# Patient Record
Sex: MALE | Race: WHITE | NOT HISPANIC OR LATINO
[De-identification: names, ages, dates, MRNs, and addresses within clinical notes are randomized per-mention and may not be internally consistent; named-entity substitution may affect disease eponyms.]

---

## 2018-11-18 PROBLEM — Z00.00 ENCOUNTER FOR PREVENTIVE HEALTH EXAMINATION: Status: ACTIVE | Noted: 2018-11-18

## 2018-12-18 ENCOUNTER — APPOINTMENT (OUTPATIENT)
Dept: UROLOGY | Facility: CLINIC | Age: 48
End: 2018-12-18
Payer: COMMERCIAL

## 2018-12-18 VITALS
SYSTOLIC BLOOD PRESSURE: 128 MMHG | HEART RATE: 83 BPM | WEIGHT: 220 LBS | DIASTOLIC BLOOD PRESSURE: 86 MMHG | HEIGHT: 69 IN | BODY MASS INDEX: 32.58 KG/M2

## 2018-12-18 DIAGNOSIS — N28.1 CYST OF KIDNEY, ACQUIRED: ICD-10-CM

## 2018-12-18 DIAGNOSIS — Z78.9 OTHER SPECIFIED HEALTH STATUS: ICD-10-CM

## 2018-12-18 DIAGNOSIS — Z82.49 FAMILY HISTORY OF ISCHEMIC HEART DISEASE AND OTHER DISEASES OF THE CIRCULATORY SYSTEM: ICD-10-CM

## 2018-12-18 LAB
BILIRUB UR QL STRIP: NORMAL
CLARITY UR: CLEAR
COLLECTION METHOD: NORMAL
GLUCOSE UR-MCNC: NORMAL
HCG UR QL: NORMAL EU/DL
HGB UR QL STRIP.AUTO: 10
KETONES UR-MCNC: NORMAL
LEUKOCYTE ESTERASE UR QL STRIP: NORMAL
NITRITE UR QL STRIP: NORMAL
PH UR STRIP: 7
PROT UR STRIP-MCNC: NORMAL
SP GR UR STRIP: 1

## 2018-12-18 PROCEDURE — 81003 URINALYSIS AUTO W/O SCOPE: CPT | Mod: QW

## 2018-12-18 PROCEDURE — 99213 OFFICE O/P EST LOW 20 MIN: CPT

## 2018-12-18 RX ORDER — BUDESONIDE AND FORMOTEROL FUMARATE DIHYDRATE 80; 4.5 UG/1; UG/1
80-4.5 AEROSOL RESPIRATORY (INHALATION)
Refills: 0 | Status: ACTIVE | COMMUNITY

## 2018-12-18 RX ORDER — TIOTROPIUM BROMIDE INHALATION SPRAY 1.56 UG/1
1.25 SPRAY, METERED RESPIRATORY (INHALATION)
Refills: 0 | Status: ACTIVE | COMMUNITY

## 2018-12-18 RX ORDER — RANITIDINE HCL 150 MG
CAPSULE ORAL
Refills: 0 | Status: ACTIVE | COMMUNITY

## 2018-12-18 RX ORDER — TAMSULOSIN HYDROCHLORIDE 0.4 MG/1
0.4 CAPSULE ORAL
Qty: 90 | Refills: 3 | Status: ACTIVE | COMMUNITY
Start: 2018-12-18 | End: 1900-01-01

## 2018-12-18 RX ORDER — ALBUTEROL SULFATE 90 UG/1
108 (90 BASE) AEROSOL, METERED RESPIRATORY (INHALATION)
Refills: 0 | Status: ACTIVE | COMMUNITY

## 2019-03-19 ENCOUNTER — APPOINTMENT (OUTPATIENT)
Dept: UROLOGY | Facility: CLINIC | Age: 49
End: 2019-03-19
Payer: COMMERCIAL

## 2019-03-19 ENCOUNTER — OTHER (OUTPATIENT)
Age: 49
End: 2019-03-19

## 2019-03-19 VITALS — BODY MASS INDEX: 32.58 KG/M2 | HEIGHT: 69 IN | WEIGHT: 220 LBS

## 2019-03-19 DIAGNOSIS — N23 UNSPECIFIED RENAL COLIC: ICD-10-CM

## 2019-03-19 LAB
BILIRUB UR QL STRIP: NORMAL
CLARITY UR: CLEAR
COLLECTION METHOD: NORMAL
GLUCOSE UR-MCNC: NORMAL
HCG UR QL: NORMAL EU/DL
HGB UR QL STRIP.AUTO: NORMAL
KETONES UR-MCNC: NORMAL
LEUKOCYTE ESTERASE UR QL STRIP: 75
NITRITE UR QL STRIP: NORMAL
PH UR STRIP: 5
PROT UR STRIP-MCNC: NORMAL
SP GR UR STRIP: 1.01

## 2019-03-19 PROCEDURE — 99213 OFFICE O/P EST LOW 20 MIN: CPT

## 2019-03-19 PROCEDURE — 81003 URINALYSIS AUTO W/O SCOPE: CPT | Mod: QW

## 2019-03-19 NOTE — PHYSICAL EXAM
[General Appearance - In No Acute Distress] : no acute distress [Abdomen Soft] : soft [Abdomen Tenderness] : non-tender [Heart Rate And Rhythm] : Heart rate and rhythm were normal [Costovertebral Angle Tenderness] : no ~M costovertebral angle tenderness [] : no respiratory distress [Normal Station and Gait] : the gait and station were normal for the patient's age

## 2019-03-19 NOTE — ASSESSMENT
[FreeTextEntry1] : Patient was right lower abdominal pain and benign exam. We'll get CT scan to assess

## 2019-03-19 NOTE — HISTORY OF PRESENT ILLNESS
[FreeTextEntry1] : 48-year-old complaining of right abdominal pain which was intermittent and now becoming constant. He also has back pain. PMD ordered abdominal ultrasound showed fatty liver, and renal cysts.\par \par There is no bowel complaint\par \par Regarding his lower urinary tract symptoms he states they are improved on tamsulosin\par \par

## 2019-03-19 NOTE — REVIEW OF SYSTEMS
[Fever] : no fever [Shortness Of Breath] : no shortness of breath [Chest Pain] : no chest pain [Constipation] : no constipation [Diarrhea] : no diarrhea [Dysuria] : no dysuria [Confused] : no confusion

## 2019-04-12 ENCOUNTER — APPOINTMENT (OUTPATIENT)
Dept: UROLOGY | Facility: CLINIC | Age: 49
End: 2019-04-12
Payer: COMMERCIAL

## 2019-04-12 ENCOUNTER — OUTPATIENT (OUTPATIENT)
Dept: OUTPATIENT SERVICES | Facility: HOSPITAL | Age: 49
LOS: 1 days | Discharge: HOME | End: 2019-04-12

## 2019-04-12 DIAGNOSIS — N39.0 URINARY TRACT INFECTION, SITE NOT SPECIFIED: ICD-10-CM

## 2019-04-12 PROCEDURE — 99213 OFFICE O/P EST LOW 20 MIN: CPT

## 2019-04-12 NOTE — REVIEW OF SYSTEMS
[Fever] : no fever [Chest Pain] : no chest pain [Abdominal Pain] : no abdominal pain [Shortness Of Breath] : no shortness of breath [Confused] : no confusion

## 2019-04-12 NOTE — PHYSICAL EXAM
[General Appearance - In No Acute Distress] : no acute distress [Abdomen Soft] : soft [Abdomen Tenderness] : non-tender [] : no respiratory distress [Costovertebral Angle Tenderness] : no ~M costovertebral angle tenderness [Oriented To Time, Place, And Person] : oriented to person, place, and time [Normal Station and Gait] : the gait and station were normal for the patient's age

## 2019-04-12 NOTE — ASSESSMENT
[FreeTextEntry1] : Nothing on CAT scan to suggest reason for pain. Because of dysuria and ejaculatory pain Will empirically start on Cipro and send off culture and we see in 3 weeks to see if there is resolution. In the meantime a recommendation followup with his primary and GI to look for non-urologic causes of the pain [Urinary Tract Infection (599.0\N39.0)] : qualitative ~C was positive

## 2019-04-12 NOTE — HISTORY OF PRESENT ILLNESS
[FreeTextEntry1] : 48-year-old complaining of right abdominal pain which is bothering the patient. Renal sonogram showed no significant findings that would be responsible for his pain. CT scan of the abdomen showed no changes compared to previous CT scan and no reason for his abdominal pain. He continues on tamsulosin 0.4 daily for BPH and now also complains of some dysuria And ejaculatory discomfort

## 2019-04-16 LAB — BACTERIA UR CULT: ABNORMAL

## 2019-05-10 ENCOUNTER — APPOINTMENT (OUTPATIENT)
Dept: UROLOGY | Facility: CLINIC | Age: 49
End: 2019-05-10

## 2019-10-15 ENCOUNTER — APPOINTMENT (OUTPATIENT)
Dept: GASTROENTEROLOGY | Facility: CLINIC | Age: 49
End: 2019-10-15

## 2020-04-23 ENCOUNTER — APPOINTMENT (OUTPATIENT)
Dept: UROLOGY | Facility: CLINIC | Age: 50
End: 2020-04-23
Payer: COMMERCIAL

## 2020-04-23 ENCOUNTER — TRANSCRIPTION ENCOUNTER (OUTPATIENT)
Age: 50
End: 2020-04-23

## 2020-04-23 PROCEDURE — 99213 OFFICE O/P EST LOW 20 MIN: CPT | Mod: 95

## 2020-04-23 NOTE — REVIEW OF SYSTEMS
[Fever] : no fever [Chest Pain] : no chest pain [Cough] : no cough [Shortness Of Breath] : shortness of breath [Diarrhea] : no diarrhea [Constipation] : no constipation [Dysuria] : no dysuria [Confused] : no confusion

## 2020-04-23 NOTE — HISTORY OF PRESENT ILLNESS
[Self] : self [Home] : at home, [unfilled] , at the time of the visit. [Other Location: e.g. Home (Enter Location, City,State)___] : at [unfilled] [Verbal consent obtained from patient] : the patient, [unfilled] [Patient] : the patient [FreeTextEntry2] : 4/23/20 at 1100 [Hematuria - Gross] : no gross hematuria [Dysuria] : no dysuria [FreeTextEntry1] : 49 year old with persistant urinary frequency, urgency, nocturia X 5, hesitancy but stream ok once it starts.  One year ago he received cipro for enterococcus uti and dysuria and ejaculatory pain with improvement of that but persistant frequency, urgency.  He is no better on tamsulosin, and oxybutynin er and vesicare were no help.  1 year ago, ct and sono were negative.  Trus is the past showed a 20 cc prostate, but cysto showed occlussive prostate 3.5 cm. Psa in 2016 was 1.1.  He has a hisotry of benign microhematuria work up and renal cysts. there is no fam history of prostate cancer.\par \par He has chronic right groin pain\par \par He continues to be followed at [facility] for stable pulm nodules and right adrenal lesion.  He has a long history of copd on multiple meds and chronic sob without change

## 2020-04-23 NOTE — ASSESSMENT
[FreeTextEntry1] : Worsening  LUTS which pt states is very bothersome.  Will order bladder sono, PSA, Urine culture.  Will Change to rapaflo after discussion with pt.  Rec follow up in office to examine for ing hernia [Urinary Tract Infection (599.0\N39.0)] : qualitative ~C was positive

## 2020-04-23 NOTE — PHYSICAL EXAM
[General Appearance - In No Acute Distress] : no acute distress [Costovertebral Angle Tenderness] : no ~M costovertebral angle tenderness [Edema] : no peripheral edema [FreeTextEntry1] : mild discomort with palpation of sp area [Oriented To Time, Place, And Person] : oriented to person, place, and time [] : no respiratory distress

## 2020-05-13 ENCOUNTER — APPOINTMENT (OUTPATIENT)
Dept: UROLOGY | Facility: CLINIC | Age: 50
End: 2020-05-13

## 2020-06-05 NOTE — HISTORY OF PRESENT ILLNESS
[Home] : at home, [unfilled] , at the time of the visit. [Medical Office: ([facility])___] : at the medical office located in  [Verbal consent obtained from patient] : the patient, [unfilled] [FreeTextEntry1] : phone number [phone]\par email of [email or web address]

## 2020-06-08 ENCOUNTER — APPOINTMENT (OUTPATIENT)
Dept: UROLOGY | Facility: CLINIC | Age: 50
End: 2020-06-08

## 2020-06-09 ENCOUNTER — TRANSCRIPTION ENCOUNTER (OUTPATIENT)
Age: 50
End: 2020-06-09

## 2020-06-16 ENCOUNTER — APPOINTMENT (OUTPATIENT)
Dept: UROLOGY | Facility: CLINIC | Age: 50
End: 2020-06-16
Payer: COMMERCIAL

## 2020-06-16 PROCEDURE — 99213 OFFICE O/P EST LOW 20 MIN: CPT | Mod: 95

## 2020-06-16 NOTE — ASSESSMENT
[FreeTextEntry1] : Pt with continued fx, urgency, nocturia despite 2 alpha blockers, and 2 bladder relaxants.  Rec urodynamic eval and discuused pod=ssible nerve stim and botox.

## 2020-06-16 NOTE — HISTORY OF PRESENT ILLNESS
[Home] : at home, [unfilled] , at the time of the visit. [Medical Office: ([facility])___] : at the medical office located in  [Verbal consent obtained from patient] : the patient, [unfilled] [FreeTextEntry1] : phone number [phone]\par email of [email or web address]\par Patient continues to have frequency, urgency, noct x 5 despite rapaflo 8 daily.  Already no response to flomax, oxybutynin er, vesicare.  He does get better flow with the rapaflo however.  U cand s neg, psa .64, bladder son no pvr and nl size prostate.  previous cysto showed occlusive rostate however.\par  [Dysuria] : no dysuria [Hematuria - Gross] : no gross hematuria [Flank Pain] : no flank pain

## 2020-06-16 NOTE — PHYSICAL EXAM
[Normal Appearance] : normal appearance [General Appearance - In No Acute Distress] : no acute distress [] : no respiratory distress

## 2020-06-16 NOTE — REVIEW OF SYSTEMS
[Fever] : no fever [Feeling Tired] : feeling tired [Sore Throat] : no sore throat [Nasal Discharge] : no nasal discharge [Lower Ext Edema] : no extremity edema [Shortness Of Breath] : no shortness of breath [Chest Pain] : no chest pain [Constipation] : no constipation [Diarrhea] : no diarrhea [Cough] : no cough

## 2020-06-30 ENCOUNTER — APPOINTMENT (OUTPATIENT)
Dept: UROLOGY | Facility: CLINIC | Age: 50
End: 2020-06-30
Payer: COMMERCIAL

## 2020-06-30 VITALS — TEMPERATURE: 97.7 F | WEIGHT: 220 LBS | HEIGHT: 69 IN | BODY MASS INDEX: 32.58 KG/M2

## 2020-06-30 PROCEDURE — 99213 OFFICE O/P EST LOW 20 MIN: CPT

## 2020-06-30 NOTE — HISTORY OF PRESENT ILLNESS
[FreeTextEntry1] : 49-year-old with persistent urinary frequency and urgency scheduled for urodynamics in the future. He came in today because of persistent chronic right lower abdominal pain and once we checked for hernia. Isn't history of a right paramedian incision for surgery done in 1982. He continues to have severe frequency urgency and nocturia with reasonable urinary flow, no bowel troubles. [Urinary Incontinence] : no urinary incontinence [Urinary Urgency] : urinary urgency [Urinary Frequency] : urinary frequency [Nocturia] : nocturia [Weak Stream] : no weak stream [Dysuria] : no dysuria [Hematuria - Gross] : no gross hematuria [Flank Pain] : no flank pain

## 2020-06-30 NOTE — REVIEW OF SYSTEMS
[Fever] : no fever [Feeling Poorly] : not feeling poorly [Heart Rate Is Slow] : the heart rate was not slow [Chest Pain] : no chest pain [Shortness Of Breath] : no shortness of breath [Cough] : no cough [Constipation] : no constipation [Diarrhea] : no diarrhea [Dysuria] : no dysuria

## 2020-06-30 NOTE — ASSESSMENT
[FreeTextEntry1] : Regarding persistent right lower quadrant discomfort recommend general surgery consultation.\par \par Regarding frequency urgency patient has appointment for urodynamic evaluation and again risks benefits and alternatives fully discussed

## 2020-07-10 ENCOUNTER — APPOINTMENT (OUTPATIENT)
Dept: UROLOGY | Facility: CLINIC | Age: 50
End: 2020-07-10
Payer: COMMERCIAL

## 2020-07-10 DIAGNOSIS — Z87.440 PERSONAL HISTORY OF URINARY (TRACT) INFECTIONS: ICD-10-CM

## 2020-07-10 DIAGNOSIS — R35.0 FREQUENCY OF MICTURITION: ICD-10-CM

## 2020-07-10 DIAGNOSIS — R31.29 OTHER MICROSCOPIC HEMATURIA: ICD-10-CM

## 2020-07-10 PROCEDURE — 51784 ANAL/URINARY MUSCLE STUDY: CPT

## 2020-07-10 PROCEDURE — 51728 CYSTOMETROGRAM W/VP: CPT

## 2020-07-10 PROCEDURE — 51741 ELECTRO-UROFLOWMETRY FIRST: CPT

## 2020-07-14 LAB — URINE CYTOLOGY: NORMAL

## 2020-08-07 ENCOUNTER — APPOINTMENT (OUTPATIENT)
Dept: UROLOGY | Facility: CLINIC | Age: 50
End: 2020-08-07
Payer: COMMERCIAL

## 2020-08-07 PROCEDURE — 52000 CYSTOURETHROSCOPY: CPT

## 2020-08-20 ENCOUNTER — TRANSCRIPTION ENCOUNTER (OUTPATIENT)
Age: 50
End: 2020-08-20

## 2020-09-16 ENCOUNTER — TRANSCRIPTION ENCOUNTER (OUTPATIENT)
Age: 50
End: 2020-09-16

## 2020-09-28 ENCOUNTER — OUTPATIENT (OUTPATIENT)
Dept: OUTPATIENT SERVICES | Facility: HOSPITAL | Age: 50
LOS: 1 days | Discharge: HOME | End: 2020-09-28
Payer: COMMERCIAL

## 2020-09-28 ENCOUNTER — TRANSCRIPTION ENCOUNTER (OUTPATIENT)
Age: 50
End: 2020-09-28

## 2020-09-28 DIAGNOSIS — R93.49 ABNORMAL RADIOLOGIC FINDINGS ON DIAGNOSTIC IMAGING OF OTHER URINARY ORGANS: ICD-10-CM

## 2020-09-28 PROCEDURE — 74183 MRI ABD W/O CNTR FLWD CNTR: CPT | Mod: 26

## 2020-10-09 ENCOUNTER — APPOINTMENT (OUTPATIENT)
Dept: UROLOGY | Facility: CLINIC | Age: 50
End: 2020-10-09
Payer: COMMERCIAL

## 2020-10-09 PROCEDURE — 99213 OFFICE O/P EST LOW 20 MIN: CPT | Mod: 25

## 2020-10-09 PROCEDURE — 51702 INSERT TEMP BLADDER CATH: CPT

## 2020-10-09 NOTE — HISTORY OF PRESENT ILLNESS
[FreeTextEntry1] : Blake is a 49-year-old male with history of urinary frequency and urgency, status post urodynamic testing which demonstrated sensory urgency with low amplitude contractions and bladder overactivity. Cystoscopy demonstrated a bladder neck obstruction. He improved on rapaflo.\par \par Ultrasound demonstrated questionable lesion on kidney. Patient allergic to iodine contrast and MRI was performed.\par \par MRI demonstrated a 1.1 x 1.0 cm right interpolar renal lesion with questionable low-level enhancement, recommend followup in 3-6 months with MRI

## 2020-10-09 NOTE — ASSESSMENT
[FreeTextEntry1] : 49-year-old male with history of urinary frequency and urgency improved on Rapaflo. MRI demonstrated a 1 cm right renal lesion with questionable low-level enhancement. All options were reviewed in detail the patient elected for observation.\par \par -Continue medication\par -Follow up 3 months MRI

## 2020-11-05 ENCOUNTER — NON-APPOINTMENT (OUTPATIENT)
Age: 50
End: 2020-11-05

## 2020-11-05 ENCOUNTER — TRANSCRIPTION ENCOUNTER (OUTPATIENT)
Age: 50
End: 2020-11-05

## 2020-11-05 ENCOUNTER — APPOINTMENT (OUTPATIENT)
Dept: SURGERY | Facility: CLINIC | Age: 50
End: 2020-11-05
Payer: COMMERCIAL

## 2020-11-05 VITALS — WEIGHT: 248 LBS | BODY MASS INDEX: 36.73 KG/M2 | HEIGHT: 69 IN

## 2020-11-05 DIAGNOSIS — M62.08 SEPARATION OF MUSCLE (NONTRAUMATIC), OTHER SITE: ICD-10-CM

## 2020-11-05 DIAGNOSIS — K42.9 UMBILICAL HERNIA W/OUT OBSTRUCTION OR GANGRENE: ICD-10-CM

## 2020-11-05 DIAGNOSIS — S39.011A STRAIN OF MUSCLE, FASCIA AND TENDON OF ABDOMEN, INITIAL ENCOUNTER: ICD-10-CM

## 2020-11-05 DIAGNOSIS — E66.09 OTHER OBESITY DUE TO EXCESS CALORIES: ICD-10-CM

## 2020-11-05 PROCEDURE — 99072 ADDL SUPL MATRL&STAF TM PHE: CPT

## 2020-11-05 PROCEDURE — 99243 OFF/OP CNSLTJ NEW/EST LOW 30: CPT

## 2020-11-05 NOTE — CONSULT LETTER
[Dear  ___] : Dear  [unfilled], [Courtesy Letter:] : I had the pleasure of seeing your patient, [unfilled], in my office today. [Please see my note below.] : Please see my note below. [Consult Closing:] : Thank you very much for allowing me to participate in the care of this patient.  If you have any questions, please do not hesitate to contact me. [FreeTextEntry3] : Respectfully,\par \par Mannie Scanlon M.D., FACS\par  [DrAlireza  ___] : Dr. SHAW

## 2020-11-05 NOTE — ASSESSMENT
[FreeTextEntry1] : Blake is a pleasant 49-year-old retired/disabled gentleman with a past medical history significant for asthma, GERD, sleep apnea, back problems on chronic narcotic medication along with chronic kidney disease and a past surgical history significant for laparoscopic cholecystectomy in 2005 for an open appendectomy in 1982 who presents to the office with concerns about right lower quadrant pain and discomfort. He had a recent CT scan of the abdomen and pelvis did not demonstrate any significant pathology or hernia in the right lower quadrant. I was able to review the images and the report with Blake today.\par \par Physical examination demonstrates a moderate degree of weakness in the right groin but no evidence of a right inguinal hernia. He has some mild weakness in the left groin but no obvious hernia. Both testicles are normal. There is no evidence of a right lower quadrant incisional hernia. He does have a small nontender easily reducible umbilical hernia which is of no clinical concern at this time. He does have a moderate diastasis recti which is likely related to his excess abdominal weight. His current BMI is 37. \par \par Blake was counseled and reassured. He is complaining of generalized abdominal symptoms including constipation and intermittent pain along with urinary frequency. I do not believe any of his symptoms are related to his small periumbilical hernia. He has gained 28 pounds over the recent past and I believe this may be contributing to some of his symptoms along with his right lower quadrant abdominal wall muscle strain. He was encouraged to return to his gastroenterologist for further evaluation and we also discussed the importance of calorie restriction and healthy eating with regard to weight loss, hernia development and recurrence, and his overall health. He was encouraged to return to me in the future if any hernia-related issues arise, of course.

## 2020-11-05 NOTE — PHYSICAL EXAM
[JVD] : no jugular venous distention  [Normal Breath Sounds] : Normal breath sounds [No Rash or Lesion] : No rash or lesion [Alert] : alert [Calm] : calm [de-identified] : overweight [de-identified] : normal [de-identified] : protuberant abdomen, moderate diastasis recti\par  [de-identified] : normal testicles [de-identified] : small reducible umbilical hernia, no groin hernia, no incisional hernia

## 2020-12-21 ENCOUNTER — TRANSCRIPTION ENCOUNTER (OUTPATIENT)
Age: 50
End: 2020-12-21

## 2020-12-23 PROBLEM — Z87.440 HISTORY OF URINARY TRACT INFECTION: Status: RESOLVED | Noted: 2019-04-12 | Resolved: 2020-12-23

## 2020-12-28 ENCOUNTER — OUTPATIENT (OUTPATIENT)
Dept: OUTPATIENT SERVICES | Facility: HOSPITAL | Age: 50
LOS: 1 days | Discharge: HOME | End: 2020-12-28
Payer: COMMERCIAL

## 2020-12-28 DIAGNOSIS — N28.1 CYST OF KIDNEY, ACQUIRED: ICD-10-CM

## 2020-12-28 PROCEDURE — 74183 MRI ABD W/O CNTR FLWD CNTR: CPT | Mod: 26

## 2021-01-01 NOTE — PHYSICAL EXAM
Statement Selected [Normal Appearance] : normal appearance [General Appearance - In No Acute Distress] : no acute distress [Abdomen Soft] : soft [FreeTextEntry1] : Obese abdomen with right paramedian lower quadrant scar. No palpable hernia in this area. [Abdomen Hernia] : no hernia was discovered [Abdomen Tenderness] : non-tender [Epididymis] : the epididymides were normal [Scrotum] : the scrotum was normal [Testes Tenderness] : no tenderness of the testes [Testes Mass (___cm)] : there were no testicular masses [Skin Color & Pigmentation] : normal skin color and pigmentation [Edema] : no peripheral edema [] : no respiratory distress [Oriented To Time, Place, And Person] : oriented to person, place, and time [Not Anxious] : not anxious [Normal Station and Gait] : the gait and station were normal for the patient's age

## 2021-01-06 ENCOUNTER — TRANSCRIPTION ENCOUNTER (OUTPATIENT)
Age: 51
End: 2021-01-06

## 2021-01-15 ENCOUNTER — APPOINTMENT (OUTPATIENT)
Dept: UROLOGY | Facility: CLINIC | Age: 51
End: 2021-01-15

## 2021-01-19 ENCOUNTER — APPOINTMENT (OUTPATIENT)
Dept: UROLOGY | Facility: CLINIC | Age: 51
End: 2021-01-19
Payer: COMMERCIAL

## 2021-01-19 PROCEDURE — 99213 OFFICE O/P EST LOW 20 MIN: CPT | Mod: 95

## 2021-01-19 NOTE — REVIEW OF SYSTEMS
[Fever] : no fever [Nasal Discharge] : no nasal discharge [Cough] : no cough [Confused] : no confusion

## 2021-01-19 NOTE — ASSESSMENT
[FreeTextEntry1] : Stable right renal lesion.  reimabe in 6 m\par improved frequency urgency  cont rapaflo

## 2021-01-19 NOTE — HISTORY OF PRESENT ILLNESS
[FreeTextEntry1] : Audio visual telemed visit via [employer] platform.  Pt in his home. verbal consent given by pt.  Hx of right renal lesion with equivocal enhancent.  pt allergic to ct contrast.  Mri show no significant change in lesion since 6 m prior.  also hx of frequency/urgency with OAB on urodynamics.  BN obstruction cystoscopically.  pt urinates with better flow and less frequecy on rapaflo.  saw gen surgery who felt no hernia  strain

## 2021-02-04 ENCOUNTER — TRANSCRIPTION ENCOUNTER (OUTPATIENT)
Age: 51
End: 2021-02-04

## 2021-02-16 ENCOUNTER — TRANSCRIPTION ENCOUNTER (OUTPATIENT)
Age: 51
End: 2021-02-16

## 2021-03-02 ENCOUNTER — TRANSCRIPTION ENCOUNTER (OUTPATIENT)
Age: 51
End: 2021-03-02

## 2021-03-22 ENCOUNTER — APPOINTMENT (OUTPATIENT)
Dept: UROLOGY | Facility: CLINIC | Age: 51
End: 2021-03-22
Payer: COMMERCIAL

## 2021-03-22 PROCEDURE — 99214 OFFICE O/P EST MOD 30 MIN: CPT | Mod: 95

## 2021-03-22 NOTE — ASSESSMENT
[FreeTextEntry1] : 51 yo with right renal mass\par the lesion in question is less than 2 cm\par difficult to assess enhancement and difficult to determine true complexity\par \par discussed findings in detail\par explained that it would be preferable to undergo q 6 months renal US\par and allow CT scans for changes noted on US\par he will f/u with Dr. Odom

## 2021-03-22 NOTE — HISTORY OF PRESENT ILLNESS
[Home] : at home, [unfilled] , at the time of the visit. [Medical Office: ([facility])___] : at the medical office located in  [FreeTextEntry1] : 49 yo with renal lesion - indeterminate\par \par MRI 12/2021\par Impression\par \par Stable indeterminate right interpolar renal lesion measuring approximately 1.2 cm with questionable low-level enhancement. Differential includes hypoenhancing renal neoplasm such as a papillary renal cell carcinoma versus a benign hemorrhagic cyst.\par \par Follow-up renal protocol CT or MRI is recommended in 6 months to demonstrate stability\par \par MRI 9/2020\par KIDNEYS: Few bilateral simple renal cysts. Largest simple cyst in the lower pole of the right kidney measures 3 cm. No hydronephrosis bilaterally. A 1.1 x 1.0 cm T1 iso-/mildly T2 hypointense lesion in the right interpolar kidney demonstrates questionable low level enhancement (series 51 image 62). Subtraction images are nondiagnostic due to artifact.\par \par \par renal US 12/2020\par 1.7 cm solid mildly echogenic mass in the medial pole of the right kideny\par

## 2021-03-22 NOTE — PHYSICAL EXAM
[General Appearance - Well Developed] : well developed [General Appearance - Well Nourished] : well nourished [] : no respiratory distress [Not Anxious] : not anxious [Normal Station and Gait] : the gait and station were normal for the patient's age

## 2021-03-22 NOTE — LETTER BODY
[Dear  ___] : Dear  [unfilled], [Consult Letter:] : I had the pleasure of evaluating your patient, [unfilled]. [Please see my note below.] : Please see my note below. [Sincerely,] : Sincerely, [FreeTextEntry3] : Lala Gary MD, FACS\par

## 2021-07-01 ENCOUNTER — TRANSCRIPTION ENCOUNTER (OUTPATIENT)
Age: 51
End: 2021-07-01

## 2021-07-27 ENCOUNTER — APPOINTMENT (OUTPATIENT)
Dept: UROLOGY | Facility: CLINIC | Age: 51
End: 2021-07-27

## 2021-07-27 ENCOUNTER — APPOINTMENT (OUTPATIENT)
Dept: UROLOGY | Facility: CLINIC | Age: 51
End: 2021-07-27
Payer: COMMERCIAL

## 2021-07-27 PROCEDURE — 99214 OFFICE O/P EST MOD 30 MIN: CPT | Mod: 95

## 2021-07-27 NOTE — HISTORY OF PRESENT ILLNESS
[Home] : at home, [unfilled] , at the time of the visit. [Medical Office: ([facility])___] : at the medical office located in  [FreeTextEntry1] : phone number: [phone]\par email: [email or web address] \par \par Follow up.  \par \par Bph, luts, oab, BN obstruction, right renal mass on silidosin 8 and myrbetriq 50 daily.  No change in urination with fvariable fx and stream.

## 2021-07-27 NOTE — REVIEW OF SYSTEMS
[Feeling Poorly] : not feeling poorly [Cough] : no cough [Dysuria] : no dysuria [Abdominal Pain] : no abdominal pain

## 2021-07-27 NOTE — PHYSICAL EXAM
[] : no respiratory distress [Oriented To Time, Place, And Person] : oriented to person, place, and time

## 2021-07-27 NOTE — ASSESSMENT
[FreeTextEntry1] : Stable luts.  continue myrbetriq, sildosin\par New PSA and creat ordered/\par New mri kidney ordered to check for interval change

## 2021-11-04 ENCOUNTER — RX RENEWAL (OUTPATIENT)
Age: 51
End: 2021-11-04

## 2022-01-25 ENCOUNTER — TRANSCRIPTION ENCOUNTER (OUTPATIENT)
Age: 52
End: 2022-01-25

## 2022-02-01 ENCOUNTER — APPOINTMENT (OUTPATIENT)
Dept: UROLOGY | Facility: CLINIC | Age: 52
End: 2022-02-01

## 2022-02-01 NOTE — HISTORY OF PRESENT ILLNESS
[Home] : at home, [unfilled] , at the time of the visit. [Medical Office: ([facility])___] : at the medical office located in  [FreeTextEntry1] :  \par [email or web address]\par \par Patient did not complete the blood work or MRI due to an injury.

## 2022-02-15 ENCOUNTER — TRANSCRIPTION ENCOUNTER (OUTPATIENT)
Age: 52
End: 2022-02-15

## 2022-02-15 RX ORDER — MIRABEGRON 50 MG/1
50 TABLET, FILM COATED, EXTENDED RELEASE ORAL
Qty: 90 | Refills: 3 | Status: ACTIVE | COMMUNITY
Start: 2021-07-27 | End: 1900-01-01

## 2022-02-15 RX ORDER — SILODOSIN 8 MG/1
8 CAPSULE ORAL DAILY
Qty: 90 | Refills: 3 | Status: ACTIVE | COMMUNITY
Start: 2020-04-23 | End: 1900-01-01

## 2022-02-15 RX ORDER — MIRABEGRON 50 MG/1
50 TABLET, FILM COATED, EXTENDED RELEASE ORAL
Qty: 90 | Refills: 3 | Status: ACTIVE | COMMUNITY
Start: 2020-08-07 | End: 1900-01-01

## 2022-03-14 ENCOUNTER — TRANSCRIPTION ENCOUNTER (OUTPATIENT)
Age: 52
End: 2022-03-14

## 2022-06-21 ENCOUNTER — APPOINTMENT (OUTPATIENT)
Dept: PAIN MANAGEMENT | Facility: CLINIC | Age: 52
End: 2022-06-21

## 2022-06-21 ENCOUNTER — APPOINTMENT (OUTPATIENT)
Dept: NEUROLOGY | Facility: CLINIC | Age: 52
End: 2022-06-21

## 2022-06-21 VITALS
SYSTOLIC BLOOD PRESSURE: 144 MMHG | WEIGHT: 248 LBS | HEIGHT: 69 IN | BODY MASS INDEX: 36.73 KG/M2 | HEART RATE: 72 BPM | DIASTOLIC BLOOD PRESSURE: 95 MMHG

## 2022-06-21 DIAGNOSIS — J45.20 MILD INTERMITTENT ASTHMA, UNCOMPLICATED: ICD-10-CM

## 2022-06-21 DIAGNOSIS — Z87.19 PERSONAL HISTORY OF OTHER DISEASES OF THE DIGESTIVE SYSTEM: ICD-10-CM

## 2022-06-21 DIAGNOSIS — J32.9 CHRONIC SINUSITIS, UNSPECIFIED: ICD-10-CM

## 2022-06-21 PROCEDURE — 99213 OFFICE O/P EST LOW 20 MIN: CPT

## 2022-07-21 ENCOUNTER — APPOINTMENT (OUTPATIENT)
Dept: PAIN MANAGEMENT | Facility: CLINIC | Age: 52
End: 2022-07-21

## 2022-07-21 ENCOUNTER — APPOINTMENT (OUTPATIENT)
Dept: NEUROLOGY | Facility: CLINIC | Age: 52
End: 2022-07-21

## 2022-07-21 VITALS
HEART RATE: 73 BPM | WEIGHT: 210 LBS | HEIGHT: 69 IN | BODY MASS INDEX: 31.1 KG/M2 | DIASTOLIC BLOOD PRESSURE: 96 MMHG | SYSTOLIC BLOOD PRESSURE: 164 MMHG

## 2022-07-21 PROCEDURE — 99213 OFFICE O/P EST LOW 20 MIN: CPT

## 2022-07-21 NOTE — HISTORY OF PRESENT ILLNESS
[FreeTextEntry1] : HPI: ORIGINAL PRESENTATION: This is a 51 year old man who has been seen in the practice for many years, he has chronic left lumbar pain into the left leg. The patient has had this pain for 5 years. The pain has worsened in the last 3 years. The pain started after no events. Patient describes pain as severe.. During the last month the pain has been in no typical pattern. Pain described as burning, sharp, shooting, cutting, pressure-like, cramping, dull/aching, throbbing. Pain is associated with numbness and pins and needles into the lower extremities. Patient has weakness in the upper and lower extremities. Pain is not changed with lying down, standing, sitting, walking, exercise, relaxation, coughing sneezing or bowel movements. The patient experiences frequent constipation \par \par ACTIVITIES: Patient could walk less than 1 blocks before the pain starts. Patient can sit 5 minutes before pain starts. Patient can stand 5 minutes before pain starts. The patient constantly lays down because of pain. Patient uses a cane at this time. Patient has difficulty performing household chores, going to work, doing yardwork or shopping, socializing with friends, participating in recreational activities and exercising at this time.\par \par TODAY'S VISIT: The reason for the visit is for a continuing active encounter. He continues to experience lower back pain and radiculopathy. He has significant bilateral knee pains, right worse than left. Orthopedics has recommended surgical intervention of the right knee, which the patient has opted to hold off on. Overall, his symptoms have remained stable.\par \par He manages medically with OxyContin 20 mg twice daily along with Percocet 10/325 3 times daily in addition to baclofen 10 mg twice daily. He has also been taking Mobic PRN. Medication regimen offers relief around 40-50% without side effects. He wishes to continue as is without change.\par \par SOAPP (a Screener and Opioid Assessment for Patients with Pain) was updated today, 6/21/22. \par Low risk: Low risk. \par \par UDS, repeated today, 6/21/22 - see separate note.

## 2022-07-21 NOTE — DISCUSSION/SUMMARY
[Medication Risks Reviewed] : Medication risks reviewed [de-identified] : Patient is medically managed at our office. He will continue as is without change. Oxycontin and Percocet were sent to patient's pharmacy through [facility] EHR. I will see him back in 4 weeks.\par \par I have consulted the  registry for the purpose of reviewing the patient's controlled substance.\par \par Overall there is at least a 30-50% reduction in pain with the prescribed analgesics. The patient denies any adverse side effects due to the medication (sleeping disturbance, constipation, sleepiness, hallucinations and/or urination problems). \par \par Urine drug screening collected today with rapid sample result consistent with given regimen. Sample to be sent for confirmatory testing with additional relief at a later time.\par \par The patient will return to the office in 4 weeks time and is aware to contact me with any question or concerns in the interim.\par \par Danisha Walden PA-C\par Jose Catherine DO\par

## 2022-07-21 NOTE — HISTORY OF PRESENT ILLNESS
[FreeTextEntry1] : HPI: ORIGINAL PRESENTATION: This is a 51 year old man who has been seen in the practice for many years, he has chronic left lumbar pain into the left leg. The patient has had this pain for 5 years. The pain has worsened in the last 3 years. The pain started after no events. Patient describes pain as severe.. During the last month the pain has been in no typical pattern. Pain described as burning, sharp, shooting, cutting, pressure-like, cramping, dull/aching, throbbing. Pain is associated with numbness and pins and needles into the lower extremities. Patient has weakness in the upper and lower extremities. Pain is not changed with lying down, standing, sitting, walking, exercise, relaxation, coughing sneezing or bowel movements. The patient experiences frequent constipation \par \par ACTIVITIES: Patient could walk less than 1 blocks before the pain starts. Patient can sit 5 minutes before pain starts. Patient can stand 5 minutes before pain starts. The patient constantly lays down because of pain. Patient uses a cane at this time. Patient has difficulty performing household chores, going to work, doing yardwork or shopping, socializing with friends, participating in recreational activities and exercising at this time.\par \par TODAY'S VISIT: The reason for the visit is for a continuing active encounter. He continues to experience lower back pain and radiculopathy. He has significant bilateral knee pains, right worse than left. Orthopedics has recommended surgical intervention of the right knee, which the patient has opted to hold off on. Overall, his symptoms have remained stable.\par \par He manages medically with OxyContin 20 mg twice daily along with Percocet 10/325 3 times daily in addition to baclofen 10 mg twice daily. He has also been taking Mobic PRN. Medication regimen offers relief around 40-50% without side effects. He wishes to continue as is without change.\par \par SOAPP (a Screener and Opioid Assessment for Patients with Pain), 6/21/22. \par Low risk: Low risk. \par \par UDS, 6/21/22 - Consistent.

## 2022-07-21 NOTE — DISCUSSION/SUMMARY
[Medication Risks Reviewed] : Medication risks reviewed [de-identified] : I have consulted the  registry for the purpose of reviewing the patient's controlled substance.\par \par Overall there is at least a 30-50% reduction in pain with the prescribed analgesics. The patient denies any adverse side effects due to the medication (sleeping disturbance, constipation, sleepiness, hallucinations and/or urination problems). \par \par There are no inconsistencies on urine toxicology screening which would necessitate an alteration of therapy.\par \par The patient will return to the office in 4 weeks time and is aware to contact me with any question or concerns in the interim.\par \par Danisha Walden PA-C\par Jose Catherine, \par

## 2022-07-21 NOTE — REASON FOR VISIT
[Follow-Up Visit] : a follow-up pain management visit [FreeTextEntry2] : follow up for lower back pain and knee pain

## 2022-07-21 NOTE — ASSESSMENT
[FreeTextEntry1] : Patient is medically managed at our office. He will continue as is without change. I will see him back in 4 weeks.

## 2022-07-21 NOTE — PHYSICAL EXAM
[Right] : right hip [] : no moderate swelling [FreeTextEntry8] : Right knee tenderness with positive Sreedhar's in the anterior lateral aspect of his knee. Negative Homans sign.

## 2022-08-23 ENCOUNTER — APPOINTMENT (OUTPATIENT)
Dept: PAIN MANAGEMENT | Facility: CLINIC | Age: 52
End: 2022-08-23

## 2022-08-23 VITALS
WEIGHT: 210 LBS | HEART RATE: 81 BPM | DIASTOLIC BLOOD PRESSURE: 98 MMHG | SYSTOLIC BLOOD PRESSURE: 152 MMHG | BODY MASS INDEX: 31.1 KG/M2 | HEIGHT: 69 IN

## 2022-08-23 PROCEDURE — 99213 OFFICE O/P EST LOW 20 MIN: CPT

## 2022-08-23 NOTE — DISCUSSION/SUMMARY
[Medication Risks Reviewed] : Medication risks reviewed [de-identified] : I have consulted the  registry for the purpose of reviewing the patient's controlled substance.\par \par Overall there is at least a 30-50% reduction in pain with the prescribed analgesics. The patient denies any adverse side effects due to the medication (sleeping disturbance, constipation, sleepiness, hallucinations and/or urination problems). \par \par There are no inconsistencies on urine toxicology screening which would necessitate an alteration of therapy.\par \par The patient will return to the office in 4 weeks time and is aware to contact me with any question or concerns in the interim.\par \par Danisha Walden PA-C\par Jose Catherine, \par

## 2022-08-23 NOTE — HISTORY OF PRESENT ILLNESS
[FreeTextEntry1] : HPI: ORIGINAL PRESENTATION: This is a 51 year old man who has been seen in the practice for many years, he has chronic left lumbar pain into the left leg. The patient has had this pain for 5 years. The pain has worsened in the last 3 years. The pain started after no events. Patient describes pain as severe.. During the last month the pain has been in no typical pattern. Pain described as burning, sharp, shooting, cutting, pressure-like, cramping, dull/aching, throbbing. Pain is associated with numbness and pins and needles into the lower extremities. Patient has weakness in the upper and lower extremities. Pain is not changed with lying down, standing, sitting, walking, exercise, relaxation, coughing sneezing or bowel movements. The patient experiences frequent constipation \par \par ACTIVITIES: Patient could walk less than 1 blocks before the pain starts. Patient can sit 5 minutes before pain starts. Patient can stand 5 minutes before pain starts. The patient constantly lays down because of pain. Patient uses a cane at this time. Patient has difficulty performing household chores, going to work, doing yardwork or shopping, socializing with friends, participating in recreational activities and exercising at this time.\par \par TODAY'S VISIT: The reason for the visit is for a continuing active encounter. His symptoms remain unchanged. He manages medically with OxyContin 20 mg twice daily along with Percocet 10/325 3 times daily in addition to baclofen 10 mg twice daily. He has also been taking Mobic PRN. Medication regimen offers relief around 40-50% without side effects. He wishes to continue as is without change.\par \par SOAPP (a Screener and Opioid Assessment for Patients with Pain), 6/21/22. \par Low risk: Low risk. \par \par UDS, 6/21/22 - Consistent.

## 2022-08-26 ENCOUNTER — NON-APPOINTMENT (OUTPATIENT)
Age: 52
End: 2022-08-26

## 2022-09-12 ENCOUNTER — APPOINTMENT (OUTPATIENT)
Dept: PAIN MANAGEMENT | Facility: CLINIC | Age: 52
End: 2022-09-12

## 2022-09-12 VITALS — BODY MASS INDEX: 31.1 KG/M2 | WEIGHT: 210 LBS | HEIGHT: 69 IN

## 2022-09-12 PROCEDURE — 99214 OFFICE O/P EST MOD 30 MIN: CPT

## 2022-09-12 NOTE — REASON FOR VISIT
[Follow-Up Visit] : a follow-up pain management visit [FreeTextEntry2] : follow up chronic left lumbar pain into the left leg/med refill

## 2022-09-12 NOTE — DISCUSSION/SUMMARY
[Medication Risks Reviewed] : Medication risks reviewed [de-identified] : Mr. Blake Floyd is a 51-year-old male with a chief complaint of lower back pain with radiculopathy into the left lower extremity. Patient has had this pain for many years following no precipitating event. Patient currently manages his pain medically with Oxcontin 20mg BID, Percocet  mg TID and Baclofen 10mg BID. He also occasionally utilizes mobic for breakthrough pain. \par \par Overall there is at least a 30-50% reduction in pain with the prescribed analgesics. The patient denies any adverse side effects due to the medication (sleeping disturbance, constipation, sleepiness, hallucinations and/or urination problems). \par \par There are no inconsistencies on urine toxicology screening which would necessitate an alteration of therapy. Last UDS was 6/21/22 and was consistent. UDS will be repeated at next visit. \par \par I have consulted the  registry for the purpose of reviewing the patient's controlled substance.\par \par The patient will return to the office in 4 weeks time and is aware to contact me with any question or concerns in the interim.\par \par I, Idalia Calzada, attest that this documentation has been prepared under the direction and in the presence of Provider CHAYO Salazarpar The documentation recorded by the scribe, in my presence, accurately reflects the service I personally performed, and the decisions made by me with my edits as appropriate.\par \par Best Regards, \par Jose Catherine D.O. \par Diplomat, American Board of Anesthesiology \par Diplomat, American Board of Pain Medicine \par Diplomat, American Board of Pain Management \par \par

## 2022-09-30 ENCOUNTER — APPOINTMENT (OUTPATIENT)
Dept: UROLOGY | Facility: CLINIC | Age: 52
End: 2022-09-30

## 2022-09-30 VITALS
WEIGHT: 210 LBS | BODY MASS INDEX: 33.75 KG/M2 | DIASTOLIC BLOOD PRESSURE: 85 MMHG | HEIGHT: 66 IN | SYSTOLIC BLOOD PRESSURE: 133 MMHG | TEMPERATURE: 98 F | HEART RATE: 91 BPM | RESPIRATION RATE: 14 BRPM

## 2022-09-30 DIAGNOSIS — N32.0 BLADDER-NECK OBSTRUCTION: ICD-10-CM

## 2022-09-30 DIAGNOSIS — N40.1 BENIGN PROSTATIC HYPERPLASIA WITH LOWER URINARY TRACT SYMPMS: ICD-10-CM

## 2022-09-30 DIAGNOSIS — R35.0 FREQUENCY OF MICTURITION: ICD-10-CM

## 2022-09-30 DIAGNOSIS — N13.8 BENIGN PROSTATIC HYPERPLASIA WITH LOWER URINARY TRACT SYMPMS: ICD-10-CM

## 2022-09-30 DIAGNOSIS — R93.49 ABNORMAL RADIOLOGIC FINDINGS ON DIAGNOSITIC IMAGING OF OTHER URINARY ORGANS: ICD-10-CM

## 2022-09-30 DIAGNOSIS — R39.15 URGENCY OF URINATION: ICD-10-CM

## 2022-09-30 PROCEDURE — 99214 OFFICE O/P EST MOD 30 MIN: CPT

## 2022-09-30 RX ORDER — SILODOSIN 8 MG/1
8 CAPSULE ORAL
Qty: 90 | Refills: 3 | Status: ACTIVE | COMMUNITY
Start: 2022-09-30 | End: 1900-01-01

## 2022-09-30 NOTE — ASSESSMENT
[FreeTextEntry1] : Patient has not complied with the MRI ordered for follow-up of right renal lesion.  Patient agrees to get MRI now and will reorder it.\par \par O AB and bladder neck obstruction with resultant frequency urgency controlled adequately on silodosin 8 daily and Myrbetriq 50 daily.  He will continue these.\par \par Chronic right lower quadrant abdominal discomfort may be related to his lumbar sacral disease and he will discuss this with his pain management and spinal physicians

## 2022-09-30 NOTE — HISTORY OF PRESENT ILLNESS
[FreeTextEntry1] : He continues on silodosin 89-year-old with history of BPH, lower urinary tract symptoms including frequency and urgency, overactive bladder, bladder neck obstruction, and right renal mass with indeterminate enhancement on previous MRI and CT scans.  And Myrbetriq 50 mg daily and says that they help his urinary symptoms and that the symptoms are now tolerable.\par \par He has persistent right lower quadrant discomfort and a history of lumbar disease seeing pain management

## 2022-10-13 ENCOUNTER — NON-APPOINTMENT (OUTPATIENT)
Age: 52
End: 2022-10-13

## 2022-10-20 ENCOUNTER — LABORATORY RESULT (OUTPATIENT)
Age: 52
End: 2022-10-20

## 2022-10-20 ENCOUNTER — APPOINTMENT (OUTPATIENT)
Dept: PAIN MANAGEMENT | Facility: CLINIC | Age: 52
End: 2022-10-20

## 2022-10-20 VITALS
DIASTOLIC BLOOD PRESSURE: 100 MMHG | SYSTOLIC BLOOD PRESSURE: 150 MMHG | BODY MASS INDEX: 33.75 KG/M2 | HEIGHT: 66 IN | WEIGHT: 210 LBS | HEART RATE: 72 BPM

## 2022-10-20 LAB — AMPHET UR-MCNC: NEGATIVE

## 2022-10-20 PROCEDURE — 99213 OFFICE O/P EST LOW 20 MIN: CPT

## 2022-10-20 PROCEDURE — 80305 DRUG TEST PRSMV DIR OPT OBS: CPT | Mod: QW

## 2022-10-20 RX ORDER — OXYCODONE AND ACETAMINOPHEN 10; 325 MG/1; MG/1
10-325 TABLET ORAL
Qty: 90 | Refills: 0 | Status: DISCONTINUED | COMMUNITY
Start: 2022-07-21 | End: 2022-10-20

## 2022-10-20 RX ORDER — OXYCODONE HYDROCHLORIDE 20 MG/1
20 TABLET, FILM COATED, EXTENDED RELEASE ORAL
Qty: 60 | Refills: 0 | Status: DISCONTINUED | COMMUNITY
Start: 2022-07-21 | End: 2022-10-20

## 2022-10-20 RX ORDER — OXYCODONE HYDROCHLORIDE 20 MG/1
20 TABLET, FILM COATED, EXTENDED RELEASE ORAL
Qty: 60 | Refills: 0 | Status: COMPLETED | COMMUNITY
Start: 2022-09-12 | End: 2022-10-20

## 2022-10-20 NOTE — DISCUSSION/SUMMARY
[Medication Risks Reviewed] : Medication risks reviewed [de-identified] : I have consulted the  registry for the purpose of reviewing the patient's controlled substance.\par \par Overall there is at least a 30-50% reduction in pain with the prescribed analgesics. The patient denies any adverse side effects due to the medication (sleeping disturbance, constipation, sleepiness, hallucinations and/or urination problems). \par \par Urine drug screening collected today with rapid sample result consistent with given regimen. Sample to be sent for confirmatory testing with additional relief at a later time.\par \par The patient will return to the office in 4 weeks time and is aware to contact me with any question or concerns in the interim.\par \par Danisha Walden PA-C\par Jose Catherine DO\par \par

## 2022-10-20 NOTE — HISTORY OF PRESENT ILLNESS
[FreeTextEntry1] : HPI: ORIGINAL PRESENTATION: This is a 51 year old man who has been seen in the practice for many years, he has chronic left lumbar pain into the left leg. The patient has had this pain for 5 years. The pain has worsened in the last 3 years. The pain started after no events. Patient describes pain as severe.. During the last month the pain has been in no typical pattern. Pain described as burning, sharp, shooting, cutting, pressure-like, cramping, dull/aching, throbbing. Pain is associated with numbness and pins and needles into the lower extremities. Patient has weakness in the upper and lower extremities. Pain is not changed with lying down, standing, sitting, walking, exercise, relaxation, coughing sneezing or bowel movements. The patient experiences frequent constipation \par \par ACTIVITIES: Patient could walk less than 1 blocks before the pain starts. Patient can sit 5 minutes before pain starts. Patient can stand 5 minutes before pain starts. The patient constantly lays down because of pain. Patient uses a cane at this time. Patient has difficulty performing household chores, going to work, doing yardwork or shopping, socializing with friends, participating in recreational activities and exercising at this time.\par \par TODAY: The reason for the visit is for a continuing active encounter. His symptoms remain unchanged. He manages medically with OxyContin 20 mg twice daily along with Percocet 10/325 3 times daily in addition to baclofen 10 mg twice daily. He has also been taking Mobic PRN. Medication regimen offers relief around 40-50% without side effects. He wishes to continue as is without change.\par \par SOAPP (a Screener and Opioid Assessment for Patients with Pain), 6/21/22. \par Low risk: Low risk. \par \par UDS, repeated today, 10/20/22 - see separate note.

## 2022-11-01 ENCOUNTER — OUTPATIENT (OUTPATIENT)
Dept: OUTPATIENT SERVICES | Facility: HOSPITAL | Age: 52
LOS: 1 days | Discharge: HOME | End: 2022-11-01

## 2022-11-01 ENCOUNTER — RESULT REVIEW (OUTPATIENT)
Age: 52
End: 2022-11-01

## 2022-11-01 DIAGNOSIS — R93.49 ABNORMAL RADIOLOGIC FINDINGS ON DIAGNOSTIC IMAGING OF OTHER URINARY ORGANS: ICD-10-CM

## 2022-11-01 PROCEDURE — 74183 MRI ABD W/O CNTR FLWD CNTR: CPT | Mod: 26

## 2022-11-11 ENCOUNTER — NON-APPOINTMENT (OUTPATIENT)
Age: 52
End: 2022-11-11

## 2022-11-17 ENCOUNTER — APPOINTMENT (OUTPATIENT)
Dept: PAIN MANAGEMENT | Facility: CLINIC | Age: 52
End: 2022-11-17

## 2022-11-17 ENCOUNTER — RX RENEWAL (OUTPATIENT)
Age: 52
End: 2022-11-17

## 2022-11-17 VITALS
SYSTOLIC BLOOD PRESSURE: 165 MMHG | HEART RATE: 83 BPM | BODY MASS INDEX: 33.75 KG/M2 | HEIGHT: 66 IN | WEIGHT: 210 LBS | DIASTOLIC BLOOD PRESSURE: 107 MMHG

## 2022-11-17 PROCEDURE — 99213 OFFICE O/P EST LOW 20 MIN: CPT

## 2022-11-17 RX ORDER — MELOXICAM 15 MG/1
15 TABLET ORAL
Qty: 90 | Refills: 0 | Status: ACTIVE | COMMUNITY
Start: 2022-11-17 | End: 1900-01-01

## 2022-11-17 NOTE — DISCUSSION/SUMMARY
[Medication Risks Reviewed] : Medication risks reviewed [de-identified] : I have consulted the  registry for the purpose of reviewing the patient's controlled substance.\par \par Overall there is at least a 30-50% reduction in pain with the prescribed analgesics. The patient denies any adverse side effects due to the medication (sleeping disturbance, constipation, sleepiness, hallucinations and/or urination problems). \par \par There are no inconsistencies on urine toxicology screening which would necessitate an alteration of therapy.\par \par The patient will return to the office in 4 weeks time and is aware to contact me with any question or concerns in the interim.\par \par Danisha Walden PA-C\par Jose Catherine DO\par \par

## 2022-11-17 NOTE — HISTORY OF PRESENT ILLNESS
[FreeTextEntry1] : HPI: ORIGINAL PRESENTATION: This is a 51 year old man who has been seen in the practice for many years, he has chronic left lumbar pain into the left leg. The patient has had this pain for 5 years. The pain has worsened in the last 3 years. The pain started after no events. Patient describes pain as severe.. During the last month the pain has been in no typical pattern. Pain described as burning, sharp, shooting, cutting, pressure-like, cramping, dull/aching, throbbing. Pain is associated with numbness and pins and needles into the lower extremities. Patient has weakness in the upper and lower extremities. Pain is not changed with lying down, standing, sitting, walking, exercise, relaxation, coughing sneezing or bowel movements. The patient experiences frequent constipation \par \par ACTIVITIES: Patient could walk less than 1 blocks before the pain starts. Patient can sit 5 minutes before pain starts. Patient can stand 5 minutes before pain starts. The patient constantly lays down because of pain. Patient uses a cane at this time. Patient has difficulty performing household chores, going to work, doing yardwork or shopping, socializing with friends, participating in recreational activities and exercising at this time.\par \par TODAY: The reason for the visit is for a continuing active encounter. His symptoms remain unchanged. He manages medically with OxyContin 20 mg twice daily along with Percocet 10/325 3 times daily in addition to baclofen 10 mg twice daily. He has also been taking Mobic PRN. Medication regimen offers relief around 40-50% without side effects. He wishes to continue as is without change.\par \par Of note, patient has trialed injections in the past without significant relief. He has not wanted to re-trial.\par \par SOAPP (a Screener and Opioid Assessment for Patients with Pain), 6/21/22. \par Low risk: Low risk. \par \par UDS, 10/20/22 - Consistent.

## 2022-12-15 ENCOUNTER — APPOINTMENT (OUTPATIENT)
Dept: PAIN MANAGEMENT | Facility: CLINIC | Age: 52
End: 2022-12-15

## 2022-12-15 VITALS
WEIGHT: 210 LBS | SYSTOLIC BLOOD PRESSURE: 142 MMHG | BODY MASS INDEX: 33.75 KG/M2 | HEART RATE: 86 BPM | DIASTOLIC BLOOD PRESSURE: 85 MMHG | HEIGHT: 66 IN

## 2022-12-15 PROCEDURE — 99213 OFFICE O/P EST LOW 20 MIN: CPT

## 2022-12-15 RX ORDER — SILODOSIN 8 MG/1
8 CAPSULE ORAL
Qty: 90 | Refills: 3 | Status: DISCONTINUED | COMMUNITY
Start: 2020-08-07 | End: 2022-12-15

## 2022-12-15 RX ORDER — SILODOSIN 8 MG/1
8 CAPSULE ORAL
Qty: 90 | Refills: 3 | Status: DISCONTINUED | COMMUNITY
Start: 2021-07-27 | End: 2022-12-15

## 2022-12-15 NOTE — PHYSICAL EXAM
[Right] : right hip [TWNoteComboBox7] : forward flexion 60 degrees [de-identified] : extension 20 degrees [de-identified] : left lateral bending 15 degrees [de-identified] : left lateral rotation 15 degrees [de-identified] : right lateral bending 20 degrees [TWNoteComboBox6] : right lateral rotation 25 degrees [] : no moderate swelling [FreeTextEntry8] : Right knee tenderness with positive Sreedhar's in the anterior lateral aspect of his knee. Negative Homans sign.

## 2022-12-15 NOTE — ASSESSMENT
[FreeTextEntry1] : Patient is medically managed at our office. We started him on Gabapentin 300mg TID today and will reasses on the next visit. I will see him back in 4 weeks.

## 2022-12-15 NOTE — HISTORY OF PRESENT ILLNESS
[FreeTextEntry1] : HPI: ORIGINAL PRESENTATION: This is a 52 year old man who has been seen in the practice for many years, he has chronic left lumbar pain into the left leg. The patient has had this pain for 5 years. The pain has worsened in the last 3 years. The pain started after no events. Patient describes pain as severe.. During the last month the pain has been in no typical pattern. Pain described as burning, sharp, shooting, cutting, pressure-like, cramping, dull/aching, throbbing. Pain is associated with numbness and pins and needles into the lower extremities. Patient has weakness in the upper and lower extremities. Pain is not changed with lying down, standing, sitting, walking, exercise, relaxation, coughing sneezing or bowel movements. The patient experiences frequent constipation \par \par ACTIVITIES: Patient could walk less than 1 blocks before the pain starts. Patient can sit 5 minutes before pain starts. Patient can stand 5 minutes before pain starts. The patient constantly lays down because of pain. Patient uses a cane at this time. Patient has difficulty performing household chores, going to work, doing yardwork or shopping, socializing with friends, participating in recreational activities and exercising at this time.\par \par TODAY: The reason for the visit is for a continuing active encounter. Last seen on 11/17/2022. He is complaining of persistent numbness in his left thigh and we will start him on Gabapentin 300mg TID today. Otherwise, he manages medically with OxyContin 20 mg twice daily along with Percocet 10/325 3 times daily in addition to Baclofen 10 mg twice daily. He has also been taking Mobic PRN. Medication regimen offers relief around 40-50% without side effects. He wishes to continue as is without change.\par \par Of note, patient has tried injections in the past without significant relief. He has not wanted to re-trial.\par \par SOAPP (a Screener and Opioid Assessment for Patients with Pain), 6/21/22. \par Low risk: Low risk. \par \par UDS, last done 10/20/22  and was consistent for the prescribed medications.

## 2022-12-15 NOTE — DISCUSSION/SUMMARY
[Medication Risks Reviewed] : Medication risks reviewed [de-identified] : I have consulted the  registry for the purpose of reviewing the patient's controlled substance.\par \par Overall there is at least a 30-50% reduction in pain with the prescribed analgesics. The patient denies any adverse side effects due to the medication (sleeping disturbance, constipation, sleepiness, hallucinations and/or urination problems). \par \par There are no inconsistencies on urine toxicology screening which would necessitate an alteration of therapy.\par \par The patient will return to the office in 4 weeks time and is aware to contact me with any question or concerns in the interim.\par \par Danisha Walden PA-C\par Jose Catherine DO\par \par

## 2023-01-17 ENCOUNTER — APPOINTMENT (OUTPATIENT)
Dept: PAIN MANAGEMENT | Facility: CLINIC | Age: 53
End: 2023-01-17
Payer: COMMERCIAL

## 2023-01-17 VITALS
BODY MASS INDEX: 33.75 KG/M2 | SYSTOLIC BLOOD PRESSURE: 132 MMHG | DIASTOLIC BLOOD PRESSURE: 82 MMHG | HEART RATE: 86 BPM | WEIGHT: 210 LBS | HEIGHT: 66 IN

## 2023-01-17 PROCEDURE — 99213 OFFICE O/P EST LOW 20 MIN: CPT

## 2023-01-17 RX ORDER — NITROFURANTOIN (MONOHYDRATE/MACROCRYSTALS) 25; 75 MG/1; MG/1
100 CAPSULE ORAL TWICE DAILY
Qty: 6 | Refills: 0 | Status: DISCONTINUED | COMMUNITY
Start: 2020-07-10 | End: 2023-01-17

## 2023-01-17 RX ORDER — NITROFURANTOIN MACROCRYSTALS 100 MG/1
100 CAPSULE ORAL
Qty: 6 | Refills: 0 | Status: DISCONTINUED | COMMUNITY
Start: 2020-08-07 | End: 2023-01-17

## 2023-01-17 RX ORDER — CIPROFLOXACIN HYDROCHLORIDE 500 MG/1
500 TABLET, FILM COATED ORAL
Qty: 14 | Refills: 1 | Status: DISCONTINUED | COMMUNITY
Start: 2019-04-12 | End: 2023-01-17

## 2023-01-17 RX ORDER — MIRABEGRON 50 MG/1
50 TABLET, FILM COATED, EXTENDED RELEASE ORAL
Qty: 90 | Refills: 3 | Status: DISCONTINUED | COMMUNITY
Start: 2022-09-30 | End: 2023-01-17

## 2023-01-18 NOTE — ASSESSMENT
[FreeTextEntry1] : Patient is medically managed at our office. We started him on Gabapentin 300mg TID and he reports that it is providing him with mild relief. He will continue as is. I will see him back in 4 weeks.

## 2023-01-18 NOTE — DISCUSSION/SUMMARY
[Medication Risks Reviewed] : Medication risks reviewed [de-identified] : I have consulted the  registry for the purpose of reviewing the patient's controlled substance.\par \par Overall there is at least a 30-50% reduction in pain with the prescribed analgesics. The patient denies any adverse side effects due to the medication (sleeping disturbance, constipation, sleepiness, hallucinations and/or urination problems). \par \par There are no inconsistencies on urine toxicology screening which would necessitate an alteration of therapy.\par \par The patient will return to the office in 4 weeks time and is aware to contact me with any question or concerns in the interim.\par \par Danisha Walden PA-C\par Jose Catherine DO\par \par

## 2023-01-18 NOTE — PHYSICAL EXAM
[Right] : right hip [TWNoteComboBox7] : forward flexion 60 degrees [de-identified] : extension 20 degrees [de-identified] : left lateral bending 15 degrees [de-identified] : left lateral rotation 15 degrees [de-identified] : right lateral bending 20 degrees [TWNoteComboBox6] : right lateral rotation 25 degrees [] : no moderate swelling [FreeTextEntry8] : Right knee tenderness with positive Sreedhar's in the anterior lateral aspect of his knee. Negative Homans sign.

## 2023-01-18 NOTE — HISTORY OF PRESENT ILLNESS
[FreeTextEntry1] : HPI: ORIGINAL PRESENTATION: This is a 52 year old man who has been seen in the practice for many years, he has chronic left lumbar pain into the left leg. The patient has had this pain for 5 years. The pain has worsened in the last 3 years. The pain started after no events. Patient describes pain as severe.. During the last month the pain has been in no typical pattern. Pain described as burning, sharp, shooting, cutting, pressure-like, cramping, dull/aching, throbbing. Pain is associated with numbness and pins and needles into the lower extremities. Patient has weakness in the upper and lower extremities. Pain is not changed with lying down, standing, sitting, walking, exercise, relaxation, coughing sneezing or bowel movements. The patient experiences frequent constipation \par \par ACTIVITIES: Patient could walk less than 1 blocks before the pain starts. Patient can sit 5 minutes before pain starts. Patient can stand 5 minutes before pain starts. The patient constantly lays down because of pain. Patient uses a cane at this time. Patient has difficulty performing household chores, going to work, doing yardwork or shopping, socializing with friends, participating in recreational activities and exercising at this time.\par \par TODAY: The reason for the visit is for a continuing active encounter. Last seen on 12/15/2022 and reports today that there has been no new complaints or acute changes to his condition. He has been taking Gabapentin 300 mg which is helping the numbness in his left thigh. He continues with OxyContin 20 mg twice daily along with Percocet 10/325 3 times daily in addition to Baclofen 10 mg twice daily. He has also been taking Mobic PRN. Medication regimen offers relief around 40-50% without side effects. We spoke about possibly decreasing his narcotics, which he does not want to do at this time. Of note, patient has tried injections in the past without significant relief. He has not wanted to re-trial.\par \par SOAPP (a Screener and Opioid Assessment for Patients with Pain), 6/21/22. \par Low risk: Low risk. \par \par UDS, last done 10/20/22 and was consistent for the prescribed medications.

## 2023-02-12 ENCOUNTER — LABORATORY RESULT (OUTPATIENT)
Age: 53
End: 2023-02-12

## 2023-02-14 ENCOUNTER — RESULT CHARGE (OUTPATIENT)
Age: 53
End: 2023-02-14

## 2023-02-14 ENCOUNTER — APPOINTMENT (OUTPATIENT)
Dept: PAIN MANAGEMENT | Facility: CLINIC | Age: 53
End: 2023-02-14
Payer: COMMERCIAL

## 2023-02-14 LAB
AMP / AMPHETAMINE: NEGATIVE
BAR / SECOBARBITAL: NEGATIVE
BUP / BUPRENORPHINE: NEGATIVE
BZO / OXAZEPAM: NEGATIVE
COC / COCAINE: NEGATIVE
CREATININE: 100 MG/DL
MDMA / METHYLENEDIOXYMETHAMPHETAMINE: NEGATIVE
MET / METHAMPHETAMINES: NEGATIVE
MOP / MORPHINE: NEGATIVE
MTD / METHADONE: NEGATIVE
OXY / OXYCODONE: POSITIVE
PCP / PHENCYCLIDINE: NEGATIVE
PH: 7
SPECIFIC GRAVITY: 1.03
TEMPERATURE: 94 C
THC / MARIJUANA: NEGATIVE

## 2023-02-14 PROCEDURE — 80305 DRUG TEST PRSMV DIR OPT OBS: CPT | Mod: QW

## 2023-02-14 PROCEDURE — 99213 OFFICE O/P EST LOW 20 MIN: CPT

## 2023-02-14 NOTE — PHYSICAL EXAM
[Right] : right hip [TWNoteComboBox7] : forward flexion 60 degrees [de-identified] : extension 20 degrees [de-identified] : left lateral bending 15 degrees [de-identified] : left lateral rotation 15 degrees [de-identified] : right lateral bending 20 degrees [TWNoteComboBox6] : right lateral rotation 25 degrees [] : no moderate swelling [FreeTextEntry8] : Right knee tenderness with positive Sreedhar's in the anterior lateral aspect of his knee. Negative Homans sign.

## 2023-02-14 NOTE — DISCUSSION/SUMMARY
[Medication Risks Reviewed] : Medication risks reviewed [de-identified] : I have consulted the  registry for the purpose of reviewing the patient's controlled substance.\par \par Overall there is at least a 30-50% reduction in pain with the prescribed analgesics. The patient denies any adverse side effects due to the medication (sleeping disturbance, constipation, sleepiness, hallucinations and/or urination problems). \par Urine drug screening collected today with rapid sample result consistent with given regimen. Sample to be sent for confirmatory testing.\par The patient will return to the office in 4 weeks time and is aware to contact me with any question or concerns in the interim.\par \par Danisha Walden PA-C\par Jose Catherine DO\par \par

## 2023-02-14 NOTE — HISTORY OF PRESENT ILLNESS
[FreeTextEntry1] : HPI: ORIGINAL PRESENTATION: This is a 52 year old man who has been seen in the practice for many years, he has chronic left lumbar pain into the left leg. The patient has had this pain for 5 years. The pain has worsened in the last 3 years. The pain started after no events. Patient describes pain as severe.. During the last month the pain has been in no typical pattern. Pain described as burning, sharp, shooting, cutting, pressure-like, cramping, dull/aching, throbbing. Pain is associated with numbness and pins and needles into the lower extremities. Patient has weakness in the upper and lower extremities. Pain is not changed with lying down, standing, sitting, walking, exercise, relaxation, coughing sneezing or bowel movements. The patient experiences frequent constipation \par \par ACTIVITIES: Patient could walk less than 1 blocks before the pain starts. Patient can sit 5 minutes before pain starts. Patient can stand 5 minutes before pain starts. The patient constantly lays down because of pain. Patient uses a cane at this time. Patient has difficulty performing household chores, going to work, doing yardwork or shopping, socializing with friends, participating in recreational activities and exercising at this time.\par \par TODAY: The reason for the visit is for a continuing active encounter. He continues with back pain and radiculopathy. He also has numbness in his left thigh. Gabapentin 300 mg TID was initially helping; however, now it's not. We discussed increasing it to 600 mg TID, which he is agreeable with. He continues with OxyContin 20 mg twice daily along with Percocet 10/325 mg 3 times daily in addition to Baclofen 10 mg twice daily. He has also been taking Mobic PRN. Medication regimen offers relief around 40-50% without side effects. Of note, patient has tried injections in the past without significant relief. He has not wanted to re-trial.\par \par SOAPP (a Screener and Opioid Assessment for Patients with Pain), 6/21/22. \par Low risk: Low risk. \par \par UDS, repeated today, 2/14/23 - see separate note.

## 2023-02-14 NOTE — ASSESSMENT
[FreeTextEntry1] : Patient is medically managed at our office. I have increased his Gabapentin to 600mg TID. He will continue with all of his other medications as is. I will see him back in 4 weeks.

## 2023-02-16 LAB

## 2023-03-14 ENCOUNTER — APPOINTMENT (OUTPATIENT)
Dept: UROLOGY | Facility: CLINIC | Age: 53
End: 2023-03-14

## 2023-03-14 ENCOUNTER — APPOINTMENT (OUTPATIENT)
Dept: PAIN MANAGEMENT | Facility: CLINIC | Age: 53
End: 2023-03-14
Payer: COMMERCIAL

## 2023-03-14 VITALS
HEIGHT: 66 IN | WEIGHT: 210 LBS | HEART RATE: 79 BPM | BODY MASS INDEX: 33.75 KG/M2 | DIASTOLIC BLOOD PRESSURE: 98 MMHG | SYSTOLIC BLOOD PRESSURE: 158 MMHG

## 2023-03-14 PROCEDURE — 99214 OFFICE O/P EST MOD 30 MIN: CPT

## 2023-03-14 NOTE — ASSESSMENT
[FreeTextEntry1] : Patient is medically managed at our office. I am switching him to [place]ys[place] ER 30 mg BID. He will continue with all of his other medications as is. I will see him back in 4 weeks.

## 2023-03-14 NOTE — PHYSICAL EXAM
[Right] : right hip [TWNoteComboBox7] : forward flexion 60 degrees [de-identified] : extension 20 degrees [de-identified] : left lateral bending 15 degrees [de-identified] : left lateral rotation 15 degrees [de-identified] : right lateral bending 20 degrees [TWNoteComboBox6] : right lateral rotation 25 degrees [] : no moderate swelling [FreeTextEntry8] : Right knee tenderness with positive Sreedhar's in the anterior lateral aspect of his knee. Negative Homans sign.

## 2023-03-14 NOTE — DISCUSSION/SUMMARY
[Medication Risks Reviewed] : Medication risks reviewed [de-identified] : I have consulted the  registry for the purpose of reviewing the patient's controlled substance.\par \par Overall there is at least a 30-50% reduction in pain with the prescribed analgesics. The patient denies any adverse side effects due to the medication (sleeping disturbance, constipation, sleepiness, hallucinations and/or urination problems). \par There are no inconsistencies on urine toxicology screening which would necessitate an alteration of therapy.\par The patient will return to the office in 4 weeks time and is aware to contact me with any question or concerns in the interim.\par \par Danisha Walden PA-C\par Jose Catherine DO\par \par

## 2023-03-14 NOTE — HISTORY OF PRESENT ILLNESS
[FreeTextEntry1] : HPI: ORIGINAL PRESENTATION: This is a 52 year old man who has been seen in the practice for many years, he has chronic left lumbar pain into the left leg. The patient has had this pain for 5 years. The pain has worsened in the last 3 years. The pain started after no events. Patient describes pain as severe.. During the last month the pain has been in no typical pattern. Pain described as burning, sharp, shooting, cutting, pressure-like, cramping, dull/aching, throbbing. Pain is associated with numbness and pins and needles into the lower extremities. Patient has weakness in the upper and lower extremities. Pain is not changed with lying down, standing, sitting, walking, exercise, relaxation, coughing sneezing or bowel movements. The patient experiences frequent constipation \par \par ACTIVITIES: Patient could walk less than 1 blocks before the pain starts. Patient can sit 5 minutes before pain starts. Patient can stand 5 minutes before pain starts. The patient constantly lays down because of pain. Patient uses a cane at this time. Patient has difficulty performing household chores, going to work, doing yardwork or shopping, socializing with friends, participating in recreational activities and exercising at this time.\par \par TODAY: The reason for the visit is for a continuing active encounter. He continues with back pain and radiculopathy. He also has numbness in his left thigh. He has been taking Gabapentin 600 mg TID which is helping him more than the 300 mg. His Oxycontin 20 mg is no longer being covered by his insurance company and today we will switch him to [place] ER instead. He continues with Percocet 10/325 mg 3 times daily for breakthrough in addition to Baclofen 10 mg twice daily. He has also been taking Mobic PRN. His medication regimen offers relief around 40-50% without side effects. Of note, patient has tried injections in the past without significant relief. He has not wanted to re-trial.\par \par SOAPP (a Screener and Opioid Assessment for Patients with Pain), 6/21/22. \par Low risk: Low risk. \par \par UDS, 2/14/23 - Consistent.

## 2023-04-11 ENCOUNTER — APPOINTMENT (OUTPATIENT)
Dept: PAIN MANAGEMENT | Facility: CLINIC | Age: 53
End: 2023-04-11
Payer: COMMERCIAL

## 2023-04-11 VITALS
BODY MASS INDEX: 33.75 KG/M2 | HEIGHT: 66 IN | HEART RATE: 83 BPM | SYSTOLIC BLOOD PRESSURE: 142 MMHG | DIASTOLIC BLOOD PRESSURE: 89 MMHG | WEIGHT: 210 LBS

## 2023-04-11 PROCEDURE — 99213 OFFICE O/P EST LOW 20 MIN: CPT

## 2023-04-11 RX ORDER — GABAPENTIN 300 MG/1
300 CAPSULE ORAL
Qty: 90 | Refills: 1 | Status: DISCONTINUED | COMMUNITY
Start: 2022-12-15 | End: 2023-04-11

## 2023-04-11 RX ORDER — HYDROCODONE BITARTRATE 30 MG/1
30 TABLET, EXTENDED RELEASE ORAL
Qty: 60 | Refills: 0 | Status: DISCONTINUED | COMMUNITY
Start: 2023-03-14 | End: 2023-04-11

## 2023-04-11 NOTE — ASSESSMENT
[FreeTextEntry1] : Mr. Floyd suffers with chronic pain. We will increase his Tramadol ER to 200 mg daily. He will continue with all of his other medications as is. I will see him back in 4 weeks.

## 2023-04-11 NOTE — HISTORY OF PRESENT ILLNESS
[FreeTextEntry1] : HPI: ORIGINAL PRESENTATION: This is a 52 year old man who has been seen in the practice for many years, he has chronic left lumbar pain into the left leg. The patient has had this pain for 5 years. The pain has worsened in the last 3 years. The pain started after no events. Patient describes pain as severe.. During the last month the pain has been in no typical pattern. Pain described as burning, sharp, shooting, cutting, pressure-like, cramping, dull/aching, throbbing. Pain is associated with numbness and pins and needles into the lower extremities. Patient has weakness in the upper and lower extremities. Pain is not changed with lying down, standing, sitting, walking, exercise, relaxation, coughing sneezing or bowel movements. The patient experiences frequent constipation \par \par ACTIVITIES: Patient could walk less than 1 blocks before the pain starts. Patient can sit 5 minutes before pain starts. Patient can stand 5 minutes before pain starts. The patient constantly lays down because of pain. Patient uses a cane at this time. Patient has difficulty performing household chores, going to work, doing yardwork or shopping, socializing with friends, participating in recreational activities and exercising at this time.\par \par TODAY: The reason for the visit is for a continuing active encounter. He continues with back pain and radiculopathy. He also has numbness in his left thigh. He is medically managed at our office.\par \par He continues with Gabapentin 600 mg TID. On his last visit, we discussed switching him to Hyslinga ER since his Oxycontin 20 mg was no longer being covered by his insurance company. Unfortunately, the Hyslinga was not available at his pharmacy. He was therefore switched to Tramadol  mg, which he states is not giving him much pain relief. He continues with Percocet 10/325 mg 3 times daily for breakthrough in addition to Baclofen 10 mg twice daily. He has also been taking Mobic PRN. We spoke about increasing his Tramadol ER, which he is agreeable with.\par \par SOAPP (a Screener and Opioid Assessment for Patients with Pain), 6/21/22. \par Low risk: Low risk. \par \par UDS, 2/14/23 - Consistent.

## 2023-04-11 NOTE — PHYSICAL EXAM
[Right] : right hip [TWNoteComboBox7] : forward flexion 60 degrees [de-identified] : extension 20 degrees [de-identified] : left lateral bending 15 degrees [de-identified] : left lateral rotation 15 degrees [de-identified] : right lateral bending 20 degrees [TWNoteComboBox6] : right lateral rotation 25 degrees [] : no moderate swelling [FreeTextEntry8] : Right knee tenderness with positive Sreedhar's in the anterior lateral aspect of his knee. Negative Homans sign.

## 2023-04-11 NOTE — DISCUSSION/SUMMARY
[Medication Risks Reviewed] : Medication risks reviewed [de-identified] : I have consulted the  registry for the purpose of reviewing the patient's controlled substance.\par \par Overall there is at least a 30-50% reduction in pain with the prescribed analgesics. The patient denies any adverse side effects due to the medication (sleeping disturbance, constipation, sleepiness, hallucinations and/or urination problems). \par There are no inconsistencies on urine toxicology screening which would necessitate an alteration of therapy.\par The patient will return to the office in 4 weeks time and is aware to contact me with any question or concerns in the interim.\par \par Danisha Walden PA-C\par Jose Catherine DO\par \par

## 2023-05-12 ENCOUNTER — APPOINTMENT (OUTPATIENT)
Dept: PAIN MANAGEMENT | Facility: CLINIC | Age: 53
End: 2023-05-12
Payer: COMMERCIAL

## 2023-05-12 VITALS — HEIGHT: 66 IN | BODY MASS INDEX: 33.75 KG/M2 | WEIGHT: 210 LBS

## 2023-05-12 PROCEDURE — 99213 OFFICE O/P EST LOW 20 MIN: CPT

## 2023-05-12 NOTE — ASSESSMENT
[FreeTextEntry1] : Mr. Floyd suffers with chronic pain. Patient has been taking Oxycontin, which he is paying out of pocket for. We will try putting in another appeal in hopes of getting the medication approved for him. He will continue all of his other medications as is. I will see him back in 4 weeks.

## 2023-05-12 NOTE — HISTORY OF PRESENT ILLNESS
[FreeTextEntry1] : HPI: ORIGINAL PRESENTATION: This is a 52 year old man who has been seen in the practice for many years, he has chronic left lumbar pain into the left leg. The patient has had this pain for 5 years. The pain has worsened in the last 3 years. The pain started after no events. Patient describes pain as severe.. During the last month the pain has been in no typical pattern. Pain described as burning, sharp, shooting, cutting, pressure-like, cramping, dull/aching, throbbing. Pain is associated with numbness and pins and needles into the lower extremities. Patient has weakness in the upper and lower extremities. Pain is not changed with lying down, standing, sitting, walking, exercise, relaxation, coughing sneezing or bowel movements. The patient experiences frequent constipation \par \par ACTIVITIES: Patient could walk less than 1 blocks before the pain starts. Patient can sit 5 minutes before pain starts. Patient can stand 5 minutes before pain starts. The patient constantly lays down because of pain. Patient uses a cane at this time. Patient has difficulty performing household chores, going to work, doing yardwork or shopping, socializing with friends, participating in recreational activities and exercising at this time.\par \par TODAY: The reason for the visit is for a continuing active encounter. He continues with back pain and radiculopathy. He also has numbness in his left thigh. He is medically managed at our office. Unfortunately, he has been having an issue with his long-acting preparation regimen. On his last few visits, his regimen was switched mutliple times. We prescribed Hysingla, which was not available at his pharmacy. He trialed Tramadol ER which did not help him. He was previously on Oxycontin which gave him excellent relief and therefore he is now back on it; however, he has to pay out of pocket for it. He states the medication helps his pain significantly. He is able to sleep better. He is able to function and do his normal ADL's without severe pain. Today, we spoke about attempting to get the medication covered for him again. He will call [facility] and let me know the next steps for the appeal.\par \par Patient continues with Gabapentin 600 mg TID. He continues with Percocet 10/325 mg 3 times daily as well for breakthrough pain in addition to Baclofen 10 mg twice daily. He has also been taking Mobic PRN.\par \par SOAPP (a Screener and Opioid Assessment for Patients with Pain), 6/21/22. \par Low risk: Low risk. \par \par UDS, 2/14/23 - Consistent.

## 2023-05-12 NOTE — DISCUSSION/SUMMARY
[Medication Risks Reviewed] : Medication risks reviewed [de-identified] : I have consulted the  registry for the purpose of reviewing the patient's controlled substance.\par \par Overall there is at least a 30-50% reduction in pain with the prescribed analgesics. The patient denies any adverse side effects due to the medication (sleeping disturbance, constipation, sleepiness, hallucinations and/or urination problems). \par There are no inconsistencies on urine toxicology screening which would necessitate an alteration of therapy.\par The patient will return to the office in 4 weeks time and is aware to contact me with any question or concerns in the interim.\par \par Danisha Walden PA-C\par Jose Catherine DO\par \par

## 2023-05-12 NOTE — PHYSICAL EXAM
[Right] : right hip [TWNoteComboBox7] : forward flexion 60 degrees [de-identified] : extension 20 degrees [de-identified] : left lateral bending 15 degrees [de-identified] : left lateral rotation 15 degrees [de-identified] : right lateral bending 20 degrees [TWNoteComboBox6] : right lateral rotation 25 degrees [] : no moderate swelling [FreeTextEntry8] : Right knee tenderness with positive Sreedhar's in the anterior lateral aspect of his knee. Negative Homans sign.

## 2023-05-16 ENCOUNTER — NON-APPOINTMENT (OUTPATIENT)
Age: 53
End: 2023-05-16

## 2023-06-06 ENCOUNTER — LABORATORY RESULT (OUTPATIENT)
Age: 53
End: 2023-06-06

## 2023-06-06 ENCOUNTER — APPOINTMENT (OUTPATIENT)
Dept: PAIN MANAGEMENT | Facility: CLINIC | Age: 53
End: 2023-06-06
Payer: COMMERCIAL

## 2023-06-06 VITALS
HEIGHT: 66 IN | BODY MASS INDEX: 33.75 KG/M2 | WEIGHT: 210 LBS | DIASTOLIC BLOOD PRESSURE: 86 MMHG | HEART RATE: 87 BPM | SYSTOLIC BLOOD PRESSURE: 132 MMHG

## 2023-06-06 LAB
AMP / AMPHETAMINE: NEGATIVE
BAR / SECOBARBITAL: NEGATIVE
BUP / BUPRENORPHINE: NEGATIVE
BZO / OXAZEPAM: NEGATIVE
COC / COCAINE: NEGATIVE
CREATININE: 50 MG/DL
MDMA / METHYLENEDIOXYMETHAMPHETAMINE: NEGATIVE
MET / METHAMPHETAMINES: NEGATIVE
MOP / MORPHINE: NEGATIVE
MTD / METHADONE: NEGATIVE
OXY / OXYCODONE: POSITIVE
PCP / PHENCYCLIDINE: NEGATIVE
PH: 7
SPECIFIC GRAVITY: 1.02
TEMPERATURE: 90 C
THC / MARIJUANA: NEGATIVE

## 2023-06-06 PROCEDURE — 80305 DRUG TEST PRSMV DIR OPT OBS: CPT | Mod: QW

## 2023-06-06 PROCEDURE — 99214 OFFICE O/P EST MOD 30 MIN: CPT

## 2023-06-06 RX ORDER — TRAMADOL HYDROCHLORIDE 100 MG/1
100 TABLET, EXTENDED RELEASE ORAL
Qty: 30 | Refills: 0 | Status: COMPLETED | COMMUNITY
Start: 2023-03-16 | End: 2023-06-06

## 2023-06-06 RX ORDER — HYDROCODONE BITARTRATE 30 MG/1
30 TABLET, EXTENDED RELEASE ORAL
Qty: 60 | Refills: 0 | Status: COMPLETED | COMMUNITY
Start: 2023-04-11 | End: 2023-06-06

## 2023-06-06 RX ORDER — TRAMADOL HYDROCHLORIDE 200 MG/1
200 TABLET, EXTENDED RELEASE ORAL
Qty: 30 | Refills: 0 | Status: COMPLETED | COMMUNITY
Start: 2023-04-11 | End: 2023-06-06

## 2023-06-06 NOTE — PHYSICAL EXAM
Attempting to grasp et tube and iv lines when released. Unable to verbally redirect. Soft restraints continued to right and left wrists. [Right] : right hip [TWNoteComboBox7] : forward flexion 60 degrees [de-identified] : extension 20 degrees [de-identified] : left lateral bending 15 degrees [de-identified] : left lateral rotation 15 degrees [de-identified] : right lateral bending 20 degrees [TWNoteComboBox6] : right lateral rotation 25 degrees [] : no moderate swelling [FreeTextEntry8] : Right knee tenderness with positive Sreedhar's in the anterior lateral aspect of his knee. Negative Homans sign.

## 2023-06-06 NOTE — DISCUSSION/SUMMARY
[Medication Risks Reviewed] : Medication risks reviewed [de-identified] : I have consulted the  registry for the purpose of reviewing the patient's controlled substance.\par \par Overall there is at least a 30-50% reduction in pain with the prescribed analgesics. The patient denies any adverse side effects due to the medication (sleeping disturbance, constipation, sleepiness, hallucinations and/or urination problems). \par Urine drug screening collected today. Sample to be sent for confirmatory testing.\par The patient will return to the office in 4 weeks time and is aware to contact me with any question or concerns in the interim.\par \par Danisha Walden PA-C\par Jose Catherine DO\par \par

## 2023-06-06 NOTE — ASSESSMENT
[FreeTextEntry1] : Mr. Floyd suffers with chronic pain. Patient has been taking Oxycontin, which he is paying out of pocket for. An appeal for the medication was denied. I am switching him to Xtampza ER 18 mg BID. He will continue all of his other medications as is. I will see him back in 4 weeks.

## 2023-06-06 NOTE — HISTORY OF PRESENT ILLNESS
[FreeTextEntry1] : HPI: ORIGINAL PRESENTATION: This is a 52 year old man who has been seen in the practice for many years, he has chronic left lumbar pain into the left leg. The patient has had this pain for 5 years. The pain has worsened in the last 3 years. The pain started after no events. Patient describes pain as severe.. During the last month the pain has been in no typical pattern. Pain described as burning, sharp, shooting, cutting, pressure-like, cramping, dull/aching, throbbing. Pain is associated with numbness and pins and needles into the lower extremities. Patient has weakness in the upper and lower extremities. Pain is not changed with lying down, standing, sitting, walking, exercise, relaxation, coughing sneezing or bowel movements. The patient experiences frequent constipation \par \par ACTIVITIES: Patient could walk less than 1 blocks before the pain starts. Patient can sit 5 minutes before pain starts. Patient can stand 5 minutes before pain starts. The patient constantly lays down because of pain. Patient uses a cane at this time. Patient has difficulty performing household chores, going to work, doing yardwork or shopping, socializing with friends, participating in recreational activities and exercising at this time.\par \par TODAY: The reason for the visit is for a continuing active encounter. He continues with back pain and radiculopathy. He also has numbness in his left thigh. He is medically managed at our office. Unfortunately, he has been having an issue with his long-acting preparation regimen. On his last few visits, his regimen was switched multiple times. An appeal for Oxycontin was done, which was denied. Patient's insurance will not pay for his Oxycontin. Instead, they will pay for Xtampza ER, which I will prescribe for him today.\par \par Patient continues with Gabapentin 600 mg TID. He continues with Percocet 10/325 mg 3 times daily as well for breakthrough pain in addition to Baclofen 10 mg twice daily. He has also been taking Mobic PRN.\par \par SOAPP (a Screener and Opioid Assessment for Patients with Pain), 6/21/22. \par Low risk: Low risk. \par \par UDS, repeated today, 6/6/23 - see separate note.

## 2023-06-09 LAB

## 2023-06-23 ENCOUNTER — NON-APPOINTMENT (OUTPATIENT)
Age: 53
End: 2023-06-23

## 2023-07-05 ENCOUNTER — APPOINTMENT (OUTPATIENT)
Dept: PAIN MANAGEMENT | Facility: CLINIC | Age: 53
End: 2023-07-05

## 2023-07-07 ENCOUNTER — APPOINTMENT (OUTPATIENT)
Dept: PAIN MANAGEMENT | Facility: CLINIC | Age: 53
End: 2023-07-07
Payer: COMMERCIAL

## 2023-07-07 VITALS — BODY MASS INDEX: 33.75 KG/M2 | WEIGHT: 210 LBS | HEIGHT: 66 IN

## 2023-07-07 PROCEDURE — 99213 OFFICE O/P EST LOW 20 MIN: CPT

## 2023-07-07 RX ORDER — OXYCODONE HYDROCHLORIDE 20 MG/1
20 TABLET, EXTENDED RELEASE ORAL
Qty: 60 | Refills: 0 | Status: COMPLETED | COMMUNITY
Start: 2022-09-30 | End: 2023-07-07

## 2023-07-07 NOTE — ASSESSMENT
[FreeTextEntry1] : Mr. Floyd suffers with chronic pain. He will continue with his current medication regimen. I have refilled gabapentin 600 mg TID, which he will re-start. I will see him back in 4 weeks.

## 2023-07-07 NOTE — PHYSICAL EXAM
[Right] : right hip [TWNoteComboBox7] : forward flexion 60 degrees [de-identified] : extension 20 degrees [de-identified] : left lateral bending 15 degrees [de-identified] : left lateral rotation 15 degrees [de-identified] : right lateral bending 20 degrees [TWNoteComboBox6] : right lateral rotation 25 degrees [] : no moderate swelling [FreeTextEntry8] : Right knee tenderness with positive Sreedhar's in the anterior lateral aspect of his knee. Negative Homans sign.

## 2023-07-07 NOTE — HISTORY OF PRESENT ILLNESS
[FreeTextEntry1] : HPI: ORIGINAL PRESENTATION: This is a 52 year old man who has been seen in the practice for many years, he has chronic left lumbar pain into the left leg. The patient has had this pain for 5 years. The pain has worsened in the last 3 years. The pain started after no events. Patient describes pain as severe.. During the last month the pain has been in no typical pattern. Pain described as burning, sharp, shooting, cutting, pressure-like, cramping, dull/aching, throbbing. Pain is associated with numbness and pins and needles into the lower extremities. Patient has weakness in the upper and lower extremities. Pain is not changed with lying down, standing, sitting, walking, exercise, relaxation, coughing sneezing or bowel movements. The patient experiences frequent constipation \par \par ACTIVITIES: Patient could walk less than 1 blocks before the pain starts. Patient can sit 5 minutes before pain starts. Patient can stand 5 minutes before pain starts. The patient constantly lays down because of pain. Patient uses a cane at this time. Patient has difficulty performing household chores, going to work, doing yardwork or shopping, socializing with friends, participating in recreational activities and exercising at this time.\par \par TODAY: The reason for the visit is for a continuing active encounter. He continues with back pain and radiculopathy. He also has numbness in his left thigh. He is medically managed at our office. He has been taking Xtampza ER 18 mg BID since last visit, which is helping him. He wishes to continue with it as is. He was previously taking Gabapentin 600 mg TID which was effective for his numbness. Unfortunately, he ran out of the medication and his numbness has worsened. He continues with Percocet 10/325 mg 3 times daily for breakthrough pain in addition to Baclofen 10 mg twice daily. He has also been taking Mobic PRN.\par \par SOAPP (a Screener and Opioid Assessment for Patients with Pain)\par Low risk: Low risk. \par \par UDS, 6/6/23 - Consistent.

## 2023-07-07 NOTE — DISCUSSION/SUMMARY
[Medication Risks Reviewed] : Medication risks reviewed [de-identified] : I have consulted the  registry for the purpose of reviewing the patient's controlled substance.\par \par Overall there is at least a 30-50% reduction in pain with the prescribed analgesics. The patient denies any adverse side effects due to the medication (sleeping disturbance, constipation, sleepiness, hallucinations and/or urination problems). \par There are no inconsistencies on urine toxicology screening which would necessitate an alteration of therapy.\par The patient will return to the office in 4 weeks time and is aware to contact me with any question or concerns in the interim.\par \par Danisha Walden PA-C\par Jose Catherine DO\par \par

## 2023-08-08 ENCOUNTER — APPOINTMENT (OUTPATIENT)
Dept: PAIN MANAGEMENT | Facility: CLINIC | Age: 53
End: 2023-08-08
Payer: COMMERCIAL

## 2023-08-08 VITALS
SYSTOLIC BLOOD PRESSURE: 141 MMHG | WEIGHT: 210 LBS | HEART RATE: 60 BPM | DIASTOLIC BLOOD PRESSURE: 93 MMHG | BODY MASS INDEX: 33.75 KG/M2 | HEIGHT: 66 IN

## 2023-08-08 PROCEDURE — 99213 OFFICE O/P EST LOW 20 MIN: CPT

## 2023-08-08 NOTE — HISTORY OF PRESENT ILLNESS
[FreeTextEntry1] : HPI: ORIGINAL PRESENTATION: This is a 52 year old man who has been seen in the practice for many years, he has chronic left lumbar pain into the left leg. The patient has had this pain for 5 years. The pain has worsened in the last 3 years. The pain started after no events. Patient describes pain as severe.. During the last month the pain has been in no typical pattern. Pain described as burning, sharp, shooting, cutting, pressure-like, cramping, dull/aching, throbbing. Pain is associated with numbness and pins and needles into the lower extremities. Patient has weakness in the upper and lower extremities. Pain is not changed with lying down, standing, sitting, walking, exercise, relaxation, coughing sneezing or bowel movements. The patient experiences frequent constipation   ACTIVITIES: Patient could walk less than 1 blocks before the pain starts. Patient can sit 5 minutes before pain starts. Patient can stand 5 minutes before pain starts. The patient constantly lays down because of pain. Patient uses a cane at this time. Patient has difficulty performing household chores, going to work, doing yardwork or shopping, socializing with friends, participating in recreational activities and exercising at this time.  TODAY: The reason for the visit is for a continuing active encounter. He continues with back pain and radiculopathy. He also has numbness in his left thigh. He is medically managed at our office. He has been taking Xtampza ER 18 mg BID since last visit, which is helping him. He continues with Percocet 10/325 mg 3 times daily for breakthrough pain in addition to Baclofen 10 mg twice daily. He also went back to taking Gabapentin 600 mg TID. He is no longer taking Mobic PRN. His medication regimen is giving him good relief without side effects and he wishes to continue as is.  SOAPP (a Screener and Opioid Assessment for Patients with Pain) Low risk: Low risk.   UDS, 6/6/23 - Consistent.

## 2023-08-08 NOTE — ASSESSMENT
[FreeTextEntry1] : Mr. Floyd suffers with chronic pain. He will continue with his current medication regimen. I will see him back in 4 weeks.

## 2023-08-08 NOTE — PHYSICAL EXAM
[Right] : right hip [TWNoteComboBox7] : forward flexion 60 degrees [de-identified] : extension 20 degrees [de-identified] : left lateral bending 15 degrees [de-identified] : left lateral rotation 15 degrees [de-identified] : right lateral bending 20 degrees [TWNoteComboBox6] : right lateral rotation 25 degrees [] : no moderate swelling [FreeTextEntry8] : Right knee tenderness with positive Sreedhar's in the anterior lateral aspect of his knee. Negative Homans sign.

## 2023-08-08 NOTE — DISCUSSION/SUMMARY
[Medication Risks Reviewed] : Medication risks reviewed [de-identified] : I have consulted the  registry for the purpose of reviewing the patient's controlled substance.\par  \par  Overall there is at least a 30-50% reduction in pain with the prescribed analgesics. The patient denies any adverse side effects due to the medication (sleeping disturbance, constipation, sleepiness, hallucinations and/or urination problems). \par  There are no inconsistencies on urine toxicology screening which would necessitate an alteration of therapy.\par  The patient will return to the office in 4 weeks time and is aware to contact me with any question or concerns in the interim.\par  \par  Danisha Walden PA-C\par  Jose Catherine DO\par  \par

## 2023-09-08 ENCOUNTER — APPOINTMENT (OUTPATIENT)
Dept: PAIN MANAGEMENT | Facility: CLINIC | Age: 53
End: 2023-09-08
Payer: COMMERCIAL

## 2023-09-08 VITALS — WEIGHT: 210 LBS | BODY MASS INDEX: 33.75 KG/M2 | HEIGHT: 66 IN

## 2023-09-08 PROCEDURE — 99213 OFFICE O/P EST LOW 20 MIN: CPT

## 2023-09-08 NOTE — DISCUSSION/SUMMARY
[Medication Risks Reviewed] : Medication risks reviewed [de-identified] : I have consulted the  registry for the purpose of reviewing the patient's controlled substance.\par  \par  Overall there is at least a 30-50% reduction in pain with the prescribed analgesics. The patient denies any adverse side effects due to the medication (sleeping disturbance, constipation, sleepiness, hallucinations and/or urination problems). \par  There are no inconsistencies on urine toxicology screening which would necessitate an alteration of therapy.\par  The patient will return to the office in 4 weeks time and is aware to contact me with any question or concerns in the interim.\par  \par  Danisha Walden PA-C\par  Jose Catherine DO\par  \par

## 2023-09-08 NOTE — PHYSICAL EXAM
[Right] : right hip [TWNoteComboBox7] : forward flexion 60 degrees [de-identified] : left lateral bending 15 degrees [de-identified] : extension 20 degrees [de-identified] : left lateral rotation 15 degrees [de-identified] : right lateral bending 20 degrees [TWNoteComboBox6] : right lateral rotation 25 degrees [] : no moderate swelling [FreeTextEntry8] : Right knee tenderness with positive Sreedhar's in the anterior lateral aspect of his knee. Negative Homans sign.

## 2023-09-26 ENCOUNTER — OUTPATIENT (OUTPATIENT)
Dept: OUTPATIENT SERVICES | Facility: HOSPITAL | Age: 53
LOS: 1 days | End: 2023-09-26
Payer: COMMERCIAL

## 2023-09-26 DIAGNOSIS — Z00.8 ENCOUNTER FOR OTHER GENERAL EXAMINATION: ICD-10-CM

## 2023-09-26 DIAGNOSIS — R10.9 UNSPECIFIED ABDOMINAL PAIN: ICD-10-CM

## 2023-09-26 PROCEDURE — A9579: CPT

## 2023-09-26 PROCEDURE — 74183 MRI ABD W/O CNTR FLWD CNTR: CPT | Mod: 26

## 2023-09-26 PROCEDURE — 74183 MRI ABD W/O CNTR FLWD CNTR: CPT

## 2023-09-27 DIAGNOSIS — R10.9 UNSPECIFIED ABDOMINAL PAIN: ICD-10-CM

## 2023-10-05 ENCOUNTER — LABORATORY RESULT (OUTPATIENT)
Age: 53
End: 2023-10-05

## 2023-10-05 ENCOUNTER — RESULT CHARGE (OUTPATIENT)
Age: 53
End: 2023-10-05

## 2023-10-05 ENCOUNTER — APPOINTMENT (OUTPATIENT)
Dept: PAIN MANAGEMENT | Facility: CLINIC | Age: 53
End: 2023-10-05
Payer: COMMERCIAL

## 2023-10-05 VITALS
DIASTOLIC BLOOD PRESSURE: 99 MMHG | WEIGHT: 220 LBS | BODY MASS INDEX: 35.36 KG/M2 | SYSTOLIC BLOOD PRESSURE: 148 MMHG | HEIGHT: 66 IN | HEART RATE: 83 BPM

## 2023-10-05 LAB
AMP / AMPHETAMINE: NEGATIVE
BAR / SECOBARBITAL: NEGATIVE
BUP / BUPRENORPHINE: NEGATIVE
BZO / OXAZEPAM: NEGATIVE
COC / COCAINE: NEGATIVE
MDMA / METHYLENEDIOXYMETHAMPHETAMINE: NEGATIVE
MET / METHAMPHETAMINES: NEGATIVE
MOP / MORPHINE: NEGATIVE
MTD / METHADONE: NEGATIVE
OXY / OXYCODONE: POSITIVE
PCP / PHENCYCLIDINE: NEGATIVE
TEMPERATURE: 90 F
THC / MARIJUANA: NEGATIVE

## 2023-10-05 PROCEDURE — 80305 DRUG TEST PRSMV DIR OPT OBS: CPT | Mod: QW

## 2023-10-05 PROCEDURE — 99213 OFFICE O/P EST LOW 20 MIN: CPT

## 2023-10-11 LAB

## 2023-11-01 RX ORDER — OXYCODONE 18 MG/1
18 CAPSULE, EXTENDED RELEASE ORAL
Qty: 60 | Refills: 0 | Status: DISCONTINUED | COMMUNITY
Start: 2023-06-06 | End: 2023-11-01

## 2023-11-01 RX ORDER — OXYCODONE 18 MG/1
18 CAPSULE, EXTENDED RELEASE ORAL
Qty: 60 | Refills: 0 | Status: DISCONTINUED | COMMUNITY
Start: 2023-10-05 | End: 2023-11-01

## 2023-11-30 RX ORDER — GABAPENTIN 600 MG/1
600 TABLET, COATED ORAL 3 TIMES DAILY
Qty: 90 | Refills: 2 | Status: ACTIVE | COMMUNITY
Start: 2023-02-14 | End: 1900-01-01

## 2023-12-01 ENCOUNTER — APPOINTMENT (OUTPATIENT)
Dept: PAIN MANAGEMENT | Facility: CLINIC | Age: 53
End: 2023-12-01

## 2023-12-14 ENCOUNTER — APPOINTMENT (OUTPATIENT)
Dept: ORTHOPEDIC SURGERY | Facility: CLINIC | Age: 53
End: 2023-12-14
Payer: COMMERCIAL

## 2023-12-14 DIAGNOSIS — M25.561 PAIN IN RIGHT KNEE: ICD-10-CM

## 2023-12-14 DIAGNOSIS — M25.562 PAIN IN RIGHT KNEE: ICD-10-CM

## 2023-12-14 PROCEDURE — 99214 OFFICE O/P EST MOD 30 MIN: CPT | Mod: 25

## 2023-12-14 PROCEDURE — 73562 X-RAY EXAM OF KNEE 3: CPT | Mod: 50

## 2023-12-14 NOTE — HISTORY OF PRESENT ILLNESS
[de-identified] : Patient here for evaluation bilateral knee pain. Denies instability Pain with ambulation and stairs  Has done cons tx in the past  NAD bilateral knee No skin breakdown Tricompartmental TTP Positive patella grind pos kunal med PF crepitus Negative lachman Negative varus/valgus instability ROM 0-110 Pain with forced extension and flexion NVI Compartments soft and NT  Xray reviewed and significant for mild bilateral knee degen changes, jt spaces preserved  mri right knee: lmt, chondromalacia mri left knee: lmt, mm degen, chondromalacia  Plan went over findings explained the imagign tx options explained  due to chronic pain and mechanical symptoms, will proceed with surgery left knee more painful  left knee arthroscopy, medial and lateral meniscectomy, synovectomy  Operative and nonoperative options discussed with patient. Surgical risks, benefits, and alternatives explained. Surgical risks include but are not exclusive to bleeding, infection, neurovascular damage, continued pain, stiffness, scarring, rsd, dvt/pe, potential failure of surgery that may require further surgery in the future. I went over incisions and rehabilitation. All questions answered.

## 2024-01-02 ENCOUNTER — APPOINTMENT (OUTPATIENT)
Dept: PAIN MANAGEMENT | Facility: CLINIC | Age: 54
End: 2024-01-02

## 2024-01-04 ENCOUNTER — APPOINTMENT (OUTPATIENT)
Dept: PAIN MANAGEMENT | Facility: CLINIC | Age: 54
End: 2024-01-04
Payer: COMMERCIAL

## 2024-01-04 DIAGNOSIS — M54.17 RADICULOPATHY, LUMBOSACRAL REGION: ICD-10-CM

## 2024-01-04 DIAGNOSIS — Z79.891 LONG TERM (CURRENT) USE OF OPIATE ANALGESIC: ICD-10-CM

## 2024-01-04 DIAGNOSIS — G89.29 OTHER CHRONIC PAIN: ICD-10-CM

## 2024-01-04 DIAGNOSIS — S83.289D OTHER TEAR OF LATERAL MENISCUS, CURRENT INJURY, UNSPECIFIED KNEE, SUBSEQUENT ENCOUNTER: ICD-10-CM

## 2024-01-04 DIAGNOSIS — M25.562 PAIN IN LEFT KNEE: ICD-10-CM

## 2024-01-04 DIAGNOSIS — M51.16 INTERVERTEBRAL DISC DISORDERS WITH RADICULOPATHY, LUMBAR REGION: ICD-10-CM

## 2024-01-04 PROCEDURE — 99214 OFFICE O/P EST MOD 30 MIN: CPT

## 2024-01-04 RX ORDER — OXYCODONE AND ACETAMINOPHEN 10; 325 MG/1; MG/1
10-325 TABLET ORAL
Qty: 90 | Refills: 0 | Status: ACTIVE | COMMUNITY
Start: 2022-09-12 | End: 1900-01-01

## 2024-01-04 RX ORDER — OXYCODONE 18 MG/1
18 CAPSULE, EXTENDED RELEASE ORAL
Qty: 60 | Refills: 0 | Status: ACTIVE | COMMUNITY
Start: 2023-10-05 | End: 1900-01-01

## 2024-01-04 NOTE — HISTORY OF PRESENT ILLNESS
[FreeTextEntry1] : HPI: ORIGINAL PRESENTATION: This is a 52 year old man who has been seen in the practice for many years, he has chronic left lumbar pain into the left leg. The patient has had this pain for 5 years. The pain has worsened in the last 3 years. The pain started after no events. Patient describes pain as severe.. During the last month the pain has been in no typical pattern. Pain described as burning, sharp, shooting, cutting, pressure-like, cramping, dull/aching, throbbing. Pain is associated with numbness and pins and needles into the lower extremities. Patient has weakness in the upper and lower extremities. Pain is not changed with lying down, standing, sitting, walking, exercise, relaxation, coughing sneezing or bowel movements. The patient experiences frequent constipation   ACTIVITIES: Patient could walk less than 1 blocks before the pain starts. Patient can sit 5 minutes before pain starts. Patient can stand 5 minutes before pain starts. The patient constantly lays down because of pain. Patient uses a cane at this time. Patient has difficulty performing household chores, going to work, doing yardwork or shopping, socializing with friends, participating in recreational activities and exercising at this time.  TODAY: The reason for the visit is for a continuing active encounter. He is a previous patient of Dr. Catherine. He continues with back pain and radiculopathy. He also has numbness in his left thigh. He is medically managed at our office. He has been taking Xtampza ER 18 mg BID and Percocet 10/325 mg 3 times daily for breakthrough pain. He also continues with Gabapentin 600 mg TID. We spoke about weaning him off of his narcotic regimen, which he objects to. Instead, he wishes to seek care elsewhere.  SOAPP (a Screener and Opioid Assessment for Patients with Pain) Low risk: Low risk.   UDS, 10/5/23 - Consistent.

## 2024-01-04 NOTE — PHYSICAL EXAM
[Right] : right hip [TWNoteComboBox7] : forward flexion 60 degrees [de-identified] : extension 20 degrees [de-identified] : left lateral bending 15 degrees [de-identified] : left lateral rotation 15 degrees [de-identified] : right lateral bending 20 degrees [TWNoteComboBox6] : right lateral rotation 25 degrees [] : no moderate swelling [FreeTextEntry8] : Right knee tenderness with positive Sreedhar's in the anterior lateral aspect of his knee. Negative Homans sign.

## 2024-01-04 NOTE — DISCUSSION/SUMMARY
[de-identified] : Mr. Floyd suffers with chronic pain. Patient does not want to wean off of his medications. I have refilled Xtampza and Percocet as is for this month. He will follow up with a different pain specialist for medication management going forward.  I have consulted the  registry for the purpose of reviewing the patient's controlled substance. There are no inconsistencies on urine toxicology screening which would necessitate an alteration of therapy.  KAIT Mcclellan MD

## 2024-01-31 ENCOUNTER — APPOINTMENT (OUTPATIENT)
Dept: PAIN MANAGEMENT | Facility: CLINIC | Age: 54
End: 2024-01-31

## 2024-02-21 ENCOUNTER — APPOINTMENT (OUTPATIENT)
Dept: MRI IMAGING | Facility: CLINIC | Age: 54
End: 2024-02-21
Payer: COMMERCIAL

## 2024-02-21 PROCEDURE — 72148 MRI LUMBAR SPINE W/O DYE: CPT | Mod: MH

## 2024-03-01 ENCOUNTER — APPOINTMENT (OUTPATIENT)
Dept: PAIN MANAGEMENT | Facility: CLINIC | Age: 54
End: 2024-03-01

## 2024-03-04 ENCOUNTER — APPOINTMENT (OUTPATIENT)
Dept: ORTHOPEDIC SURGERY | Facility: AMBULATORY SURGERY CENTER | Age: 54
End: 2024-03-04

## 2024-03-12 ENCOUNTER — APPOINTMENT (OUTPATIENT)
Dept: ORTHOPEDIC SURGERY | Facility: CLINIC | Age: 54
End: 2024-03-12

## 2024-03-14 ENCOUNTER — APPOINTMENT (OUTPATIENT)
Dept: ORTHOPEDIC SURGERY | Facility: CLINIC | Age: 54
End: 2024-03-14

## 2024-03-29 ENCOUNTER — APPOINTMENT (OUTPATIENT)
Dept: PAIN MANAGEMENT | Facility: CLINIC | Age: 54
End: 2024-03-29

## 2024-07-07 ENCOUNTER — EMERGENCY (EMERGENCY)
Facility: HOSPITAL | Age: 54
LOS: 0 days | Discharge: HOME | End: 2024-07-08
Attending: EMERGENCY MEDICINE
Payer: MEDICARE

## 2024-07-07 VITALS
RESPIRATION RATE: 20 BRPM | TEMPERATURE: 98 F | DIASTOLIC BLOOD PRESSURE: 99 MMHG | SYSTOLIC BLOOD PRESSURE: 173 MMHG | HEART RATE: 62 BPM | OXYGEN SATURATION: 99 %

## 2024-07-07 DIAGNOSIS — M54.50 LOW BACK PAIN, UNSPECIFIED: ICD-10-CM

## 2024-07-07 DIAGNOSIS — Z76.0 ENCOUNTER FOR ISSUE OF REPEAT PRESCRIPTION: ICD-10-CM

## 2024-07-07 DIAGNOSIS — Z87.891 PERSONAL HISTORY OF NICOTINE DEPENDENCE: ICD-10-CM

## 2024-07-07 DIAGNOSIS — G89.29 OTHER CHRONIC PAIN: ICD-10-CM

## 2024-07-07 PROCEDURE — 99284 EMERGENCY DEPT VISIT MOD MDM: CPT | Mod: FS

## 2024-07-07 PROCEDURE — 99285 EMERGENCY DEPT VISIT HI MDM: CPT | Mod: QJ

## 2024-07-07 RX ORDER — OXYCODONE HYDROCHLORIDE 100 MG/5ML
15 SOLUTION ORAL ONCE
Refills: 0 | Status: DISCONTINUED | OUTPATIENT
Start: 2024-07-07 | End: 2024-07-07

## 2024-07-07 RX ORDER — OXYCODONE HYDROCHLORIDE 100 MG/5ML
10 SOLUTION ORAL ONCE
Refills: 0 | Status: DISCONTINUED | OUTPATIENT
Start: 2024-07-07 | End: 2024-07-07

## 2024-07-07 RX ADMIN — OXYCODONE HYDROCHLORIDE 10 MILLIGRAM(S): 100 SOLUTION ORAL at 22:54
